# Patient Record
Sex: MALE | Race: WHITE | Employment: FULL TIME | URBAN - METROPOLITAN AREA
[De-identification: names, ages, dates, MRNs, and addresses within clinical notes are randomized per-mention and may not be internally consistent; named-entity substitution may affect disease eponyms.]

---

## 2023-06-21 ENCOUNTER — PREP FOR PROCEDURE (OUTPATIENT)
Dept: ORTHOPEDIC SURGERY | Age: 38
End: 2023-06-21

## 2023-06-21 ENCOUNTER — OFFICE VISIT (OUTPATIENT)
Dept: ORTHOPEDIC SURGERY | Age: 38
End: 2023-06-21

## 2023-06-21 VITALS
WEIGHT: 175 LBS | SYSTOLIC BLOOD PRESSURE: 143 MMHG | HEART RATE: 80 BPM | DIASTOLIC BLOOD PRESSURE: 73 MMHG | BODY MASS INDEX: 23.7 KG/M2 | TEMPERATURE: 97.6 F | OXYGEN SATURATION: 98 % | HEIGHT: 72 IN

## 2023-06-21 DIAGNOSIS — S29.011A RUPTURE OF PECTORALIS MAJOR MUSCLE, INITIAL ENCOUNTER: ICD-10-CM

## 2023-06-21 DIAGNOSIS — Z01.818 PRE-OP EXAM: ICD-10-CM

## 2023-06-21 DIAGNOSIS — M25.511 RIGHT SHOULDER PAIN, UNSPECIFIED CHRONICITY: Primary | ICD-10-CM

## 2023-06-21 LAB
ANION GAP SERPL CALCULATED.3IONS-SCNC: 14 MEQ/L (ref 9–15)
BASOPHILS # BLD: 0 K/UL (ref 0–0.2)
BASOPHILS NFR BLD: 0.9 %
BUN SERPL-MCNC: 17 MG/DL (ref 6–20)
CALCIUM SERPL-MCNC: 9.6 MG/DL (ref 8.5–9.9)
CHLORIDE SERPL-SCNC: 102 MEQ/L (ref 95–107)
CO2 SERPL-SCNC: 25 MEQ/L (ref 20–31)
CREAT SERPL-MCNC: 0.94 MG/DL (ref 0.7–1.2)
EOSINOPHIL # BLD: 0 K/UL (ref 0–0.7)
EOSINOPHIL NFR BLD: 0.9 %
ERYTHROCYTE [DISTWIDTH] IN BLOOD BY AUTOMATED COUNT: 13.3 % (ref 11.5–14.5)
GLUCOSE SERPL-MCNC: 104 MG/DL (ref 70–99)
HCT VFR BLD AUTO: 40.2 % (ref 42–52)
HGB BLD-MCNC: 13.6 G/DL (ref 14–18)
LYMPHOCYTES # BLD: 1.2 K/UL (ref 1–4.8)
LYMPHOCYTES NFR BLD: 22.4 %
MCH RBC QN AUTO: 30.5 PG (ref 27–31.3)
MCHC RBC AUTO-ENTMCNC: 33.8 % (ref 33–37)
MCV RBC AUTO: 90.2 FL (ref 79–92.2)
MONOCYTES # BLD: 0.2 K/UL (ref 0.2–0.8)
MONOCYTES NFR BLD: 4.7 %
NEUTROPHILS # BLD: 3.7 K/UL (ref 1.4–6.5)
NEUTS SEG NFR BLD: 71.1 %
PLATELET # BLD AUTO: 226 K/UL (ref 130–400)
POTASSIUM SERPL-SCNC: 4.3 MEQ/L (ref 3.4–4.9)
RBC # BLD AUTO: 4.46 M/UL (ref 4.7–6.1)
SODIUM SERPL-SCNC: 141 MEQ/L (ref 135–144)
WBC # BLD AUTO: 5.2 K/UL (ref 4.8–10.8)

## 2023-06-21 RX ORDER — CHLORHEXIDINE GLUCONATE 4 G/100ML
SOLUTION TOPICAL
Qty: 118 ML | Refills: 0 | Status: SHIPPED | OUTPATIENT
Start: 2023-06-21

## 2023-06-25 RX ORDER — SODIUM CHLORIDE 0.9 % (FLUSH) 0.9 %
5-40 SYRINGE (ML) INJECTION EVERY 12 HOURS SCHEDULED
OUTPATIENT
Start: 2023-06-25

## 2023-06-25 RX ORDER — SODIUM CHLORIDE 9 MG/ML
INJECTION, SOLUTION INTRAVENOUS PRN
OUTPATIENT
Start: 2023-06-25

## 2023-06-25 RX ORDER — SODIUM CHLORIDE 0.9 % (FLUSH) 0.9 %
5-40 SYRINGE (ML) INJECTION PRN
OUTPATIENT
Start: 2023-06-25

## 2023-06-25 RX ORDER — SODIUM CHLORIDE, SODIUM LACTATE, POTASSIUM CHLORIDE, CALCIUM CHLORIDE 600; 310; 30; 20 MG/100ML; MG/100ML; MG/100ML; MG/100ML
INJECTION, SOLUTION INTRAVENOUS CONTINUOUS
OUTPATIENT
Start: 2023-06-25

## 2023-06-26 ENCOUNTER — HOSPITAL ENCOUNTER (OUTPATIENT)
Age: 38
Setting detail: OUTPATIENT SURGERY
Discharge: HOME | End: 2023-06-26
Attending: ORTHOPAEDIC SURGERY | Admitting: ORTHOPAEDIC SURGERY
Payer: COMMERCIAL

## 2023-06-26 ENCOUNTER — ANESTHESIA EVENT (OUTPATIENT)
Dept: OPERATING ROOM | Age: 38
End: 2023-06-26
Payer: COMMERCIAL

## 2023-06-26 ENCOUNTER — ANESTHESIA (OUTPATIENT)
Dept: OPERATING ROOM | Age: 38
End: 2023-06-26
Payer: COMMERCIAL

## 2023-06-26 VITALS
BODY MASS INDEX: 23.7 KG/M2 | HEART RATE: 71 BPM | RESPIRATION RATE: 16 BRPM | HEIGHT: 72 IN | OXYGEN SATURATION: 95 % | WEIGHT: 175 LBS | SYSTOLIC BLOOD PRESSURE: 125 MMHG | TEMPERATURE: 97.5 F | DIASTOLIC BLOOD PRESSURE: 67 MMHG

## 2023-06-26 DIAGNOSIS — S29.011D RUPTURE OF PECTORALIS MAJOR MUSCLE, SUBSEQUENT ENCOUNTER: Primary | ICD-10-CM

## 2023-06-26 PROCEDURE — 7100000011 HC PHASE II RECOVERY - ADDTL 15 MIN: Performed by: ORTHOPAEDIC SURGERY

## 2023-06-26 PROCEDURE — 2580000003 HC RX 258: Performed by: ORTHOPAEDIC SURGERY

## 2023-06-26 PROCEDURE — 2580000003 HC RX 258: Performed by: PHYSICIAN ASSISTANT

## 2023-06-26 PROCEDURE — 24341 RPR TDN/MUSC UPR A/E EACH: CPT | Performed by: ORTHOPAEDIC SURGERY

## 2023-06-26 PROCEDURE — 3700000001 HC ADD 15 MINUTES (ANESTHESIA): Performed by: ORTHOPAEDIC SURGERY

## 2023-06-26 PROCEDURE — 2500000003 HC RX 250 WO HCPCS: Performed by: ORTHOPAEDIC SURGERY

## 2023-06-26 PROCEDURE — 64415 NJX AA&/STRD BRCH PLXS IMG: CPT | Performed by: STUDENT IN AN ORGANIZED HEALTH CARE EDUCATION/TRAINING PROGRAM

## 2023-06-26 PROCEDURE — 2720000010 HC SURG SUPPLY STERILE: Performed by: ORTHOPAEDIC SURGERY

## 2023-06-26 PROCEDURE — 6360000002 HC RX W HCPCS: Performed by: NURSE ANESTHETIST, CERTIFIED REGISTERED

## 2023-06-26 PROCEDURE — 7100000001 HC PACU RECOVERY - ADDTL 15 MIN: Performed by: ORTHOPAEDIC SURGERY

## 2023-06-26 PROCEDURE — 2709999900 HC NON-CHARGEABLE SUPPLY: Performed by: ORTHOPAEDIC SURGERY

## 2023-06-26 PROCEDURE — 6370000000 HC RX 637 (ALT 250 FOR IP): Performed by: STUDENT IN AN ORGANIZED HEALTH CARE EDUCATION/TRAINING PROGRAM

## 2023-06-26 PROCEDURE — 6360000002 HC RX W HCPCS: Performed by: PHYSICIAN ASSISTANT

## 2023-06-26 PROCEDURE — 6360000002 HC RX W HCPCS: Performed by: STUDENT IN AN ORGANIZED HEALTH CARE EDUCATION/TRAINING PROGRAM

## 2023-06-26 PROCEDURE — C1713 ANCHOR/SCREW BN/BN,TIS/BN: HCPCS | Performed by: ORTHOPAEDIC SURGERY

## 2023-06-26 PROCEDURE — 3600000004 HC SURGERY LEVEL 4 BASE: Performed by: ORTHOPAEDIC SURGERY

## 2023-06-26 PROCEDURE — 3700000000 HC ANESTHESIA ATTENDED CARE: Performed by: ORTHOPAEDIC SURGERY

## 2023-06-26 PROCEDURE — 2500000003 HC RX 250 WO HCPCS: Performed by: NURSE ANESTHETIST, CERTIFIED REGISTERED

## 2023-06-26 PROCEDURE — 3600000014 HC SURGERY LEVEL 4 ADDTL 15MIN: Performed by: ORTHOPAEDIC SURGERY

## 2023-06-26 PROCEDURE — 7100000000 HC PACU RECOVERY - FIRST 15 MIN: Performed by: ORTHOPAEDIC SURGERY

## 2023-06-26 PROCEDURE — A4217 STERILE WATER/SALINE, 500 ML: HCPCS | Performed by: ORTHOPAEDIC SURGERY

## 2023-06-26 PROCEDURE — 7100000010 HC PHASE II RECOVERY - FIRST 15 MIN: Performed by: ORTHOPAEDIC SURGERY

## 2023-06-26 DEVICE — KIT REP INCL 3 L PEC BTTN ATTCH INSRT FIBERTAPE SUTS W/ NDL: Type: IMPLANTABLE DEVICE | Site: SHOULDER | Status: FUNCTIONAL

## 2023-06-26 RX ORDER — MIDAZOLAM HYDROCHLORIDE 1 MG/ML
INJECTION INTRAMUSCULAR; INTRAVENOUS PRN
Status: DISCONTINUED | OUTPATIENT
Start: 2023-06-26 | End: 2023-06-26 | Stop reason: SDUPTHER

## 2023-06-26 RX ORDER — SODIUM CHLORIDE 0.9 % (FLUSH) 0.9 %
5-40 SYRINGE (ML) INJECTION PRN
Status: DISCONTINUED | OUTPATIENT
Start: 2023-06-26 | End: 2023-06-26 | Stop reason: HOSPADM

## 2023-06-26 RX ORDER — MAGNESIUM HYDROXIDE 1200 MG/15ML
LIQUID ORAL CONTINUOUS PRN
Status: DISCONTINUED | OUTPATIENT
Start: 2023-06-26 | End: 2023-06-26 | Stop reason: HOSPADM

## 2023-06-26 RX ORDER — SODIUM CHLORIDE 9 MG/ML
INJECTION, SOLUTION INTRAVENOUS PRN
Status: DISCONTINUED | OUTPATIENT
Start: 2023-06-26 | End: 2023-06-26 | Stop reason: HOSPADM

## 2023-06-26 RX ORDER — OXYCODONE HYDROCHLORIDE AND ACETAMINOPHEN 5; 325 MG/1; MG/1
1 TABLET ORAL EVERY 6 HOURS PRN
Qty: 20 TABLET | Refills: 0 | Status: SHIPPED | OUTPATIENT
Start: 2023-06-26 | End: 2023-07-01

## 2023-06-26 RX ORDER — ONDANSETRON 2 MG/ML
INJECTION INTRAMUSCULAR; INTRAVENOUS PRN
Status: DISCONTINUED | OUTPATIENT
Start: 2023-06-26 | End: 2023-06-26 | Stop reason: SDUPTHER

## 2023-06-26 RX ORDER — SODIUM CHLORIDE 0.9 % (FLUSH) 0.9 %
5-40 SYRINGE (ML) INJECTION EVERY 12 HOURS SCHEDULED
Status: DISCONTINUED | OUTPATIENT
Start: 2023-06-26 | End: 2023-06-26 | Stop reason: HOSPADM

## 2023-06-26 RX ORDER — ROCURONIUM BROMIDE 10 MG/ML
INJECTION, SOLUTION INTRAVENOUS PRN
Status: DISCONTINUED | OUTPATIENT
Start: 2023-06-26 | End: 2023-06-26 | Stop reason: SDUPTHER

## 2023-06-26 RX ORDER — ROPIVACAINE HYDROCHLORIDE 5 MG/ML
INJECTION, SOLUTION EPIDURAL; INFILTRATION; PERINEURAL
Status: COMPLETED | OUTPATIENT
Start: 2023-06-26 | End: 2023-06-26

## 2023-06-26 RX ORDER — MEPERIDINE HYDROCHLORIDE 50 MG/ML
INJECTION INTRAMUSCULAR; INTRAVENOUS; SUBCUTANEOUS PRN
Status: DISCONTINUED | OUTPATIENT
Start: 2023-06-26 | End: 2023-06-26 | Stop reason: SDUPTHER

## 2023-06-26 RX ORDER — PROPOFOL 10 MG/ML
INJECTION, EMULSION INTRAVENOUS PRN
Status: DISCONTINUED | OUTPATIENT
Start: 2023-06-26 | End: 2023-06-26 | Stop reason: SDUPTHER

## 2023-06-26 RX ORDER — DEXAMETHASONE SODIUM PHOSPHATE 10 MG/ML
INJECTION INTRAMUSCULAR; INTRAVENOUS PRN
Status: DISCONTINUED | OUTPATIENT
Start: 2023-06-26 | End: 2023-06-26 | Stop reason: SDUPTHER

## 2023-06-26 RX ORDER — BUPIVACAINE HYDROCHLORIDE AND EPINEPHRINE 5; 5 MG/ML; UG/ML
INJECTION, SOLUTION EPIDURAL; INTRACAUDAL; PERINEURAL PRN
Status: DISCONTINUED | OUTPATIENT
Start: 2023-06-26 | End: 2023-06-26 | Stop reason: HOSPADM

## 2023-06-26 RX ORDER — LIDOCAINE HYDROCHLORIDE 10 MG/ML
1 INJECTION, SOLUTION EPIDURAL; INFILTRATION; INTRACAUDAL; PERINEURAL
Status: DISCONTINUED | OUTPATIENT
Start: 2023-06-26 | End: 2023-06-26 | Stop reason: HOSPADM

## 2023-06-26 RX ORDER — METOCLOPRAMIDE HYDROCHLORIDE 5 MG/ML
10 INJECTION INTRAMUSCULAR; INTRAVENOUS
Status: DISCONTINUED | OUTPATIENT
Start: 2023-06-26 | End: 2023-06-26 | Stop reason: HOSPADM

## 2023-06-26 RX ORDER — OXYCODONE HYDROCHLORIDE 5 MG/1
5 CAPSULE ORAL
Status: COMPLETED | OUTPATIENT
Start: 2023-06-26 | End: 2023-06-26

## 2023-06-26 RX ORDER — FENTANYL CITRATE 50 UG/ML
INJECTION, SOLUTION INTRAMUSCULAR; INTRAVENOUS PRN
Status: DISCONTINUED | OUTPATIENT
Start: 2023-06-26 | End: 2023-06-26 | Stop reason: SDUPTHER

## 2023-06-26 RX ORDER — FENTANYL CITRATE 0.05 MG/ML
50 INJECTION, SOLUTION INTRAMUSCULAR; INTRAVENOUS EVERY 10 MIN PRN
Status: DISCONTINUED | OUTPATIENT
Start: 2023-06-26 | End: 2023-06-26 | Stop reason: HOSPADM

## 2023-06-26 RX ORDER — DIPHENHYDRAMINE HYDROCHLORIDE 50 MG/ML
12.5 INJECTION INTRAMUSCULAR; INTRAVENOUS
Status: DISCONTINUED | OUTPATIENT
Start: 2023-06-26 | End: 2023-06-26 | Stop reason: HOSPADM

## 2023-06-26 RX ORDER — MEPERIDINE HYDROCHLORIDE 25 MG/ML
12.5 INJECTION INTRAMUSCULAR; INTRAVENOUS; SUBCUTANEOUS
Status: DISCONTINUED | OUTPATIENT
Start: 2023-06-26 | End: 2023-06-26 | Stop reason: HOSPADM

## 2023-06-26 RX ORDER — CEFAZOLIN SODIUM IN 0.9 % NACL 2 G/100 ML
2000 PLASTIC BAG, INJECTION (ML) INTRAVENOUS
Status: COMPLETED | OUTPATIENT
Start: 2023-06-26 | End: 2023-06-26

## 2023-06-26 RX ORDER — SODIUM CHLORIDE, SODIUM LACTATE, POTASSIUM CHLORIDE, CALCIUM CHLORIDE 600; 310; 30; 20 MG/100ML; MG/100ML; MG/100ML; MG/100ML
INJECTION, SOLUTION INTRAVENOUS CONTINUOUS
Status: DISCONTINUED | OUTPATIENT
Start: 2023-06-26 | End: 2023-06-26 | Stop reason: HOSPADM

## 2023-06-26 RX ORDER — ONDANSETRON 2 MG/ML
4 INJECTION INTRAMUSCULAR; INTRAVENOUS
Status: DISCONTINUED | OUTPATIENT
Start: 2023-06-26 | End: 2023-06-26 | Stop reason: HOSPADM

## 2023-06-26 RX ORDER — HYDROMORPHONE HYDROCHLORIDE 1 MG/ML
0.5 INJECTION, SOLUTION INTRAMUSCULAR; INTRAVENOUS; SUBCUTANEOUS EVERY 10 MIN PRN
Status: DISCONTINUED | OUTPATIENT
Start: 2023-06-26 | End: 2023-06-26 | Stop reason: HOSPADM

## 2023-06-26 RX ADMIN — SODIUM CHLORIDE, POTASSIUM CHLORIDE, SODIUM LACTATE AND CALCIUM CHLORIDE: 600; 310; 30; 20 INJECTION, SOLUTION INTRAVENOUS at 13:39

## 2023-06-26 RX ADMIN — ROCURONIUM BROMIDE 20 MG: 10 INJECTION, SOLUTION INTRAVENOUS at 14:22

## 2023-06-26 RX ADMIN — Medication 2000 MG: at 12:25

## 2023-06-26 RX ADMIN — PROPOFOL 250 MG: 10 INJECTION, EMULSION INTRAVENOUS at 12:25

## 2023-06-26 RX ADMIN — ROCURONIUM BROMIDE 30 MG: 10 INJECTION, SOLUTION INTRAVENOUS at 13:58

## 2023-06-26 RX ADMIN — SODIUM CHLORIDE, POTASSIUM CHLORIDE, SODIUM LACTATE AND CALCIUM CHLORIDE: 600; 310; 30; 20 INJECTION, SOLUTION INTRAVENOUS at 09:48

## 2023-06-26 RX ADMIN — TRANEXAMIC ACID 1000 MG: 100 INJECTION, SOLUTION INTRAVENOUS at 12:45

## 2023-06-26 RX ADMIN — OXYCODONE HYDROCHLORIDE 5 MG: 5 CAPSULE ORAL at 16:21

## 2023-06-26 RX ADMIN — MIDAZOLAM HYDROCHLORIDE 2 MG: 1 INJECTION, SOLUTION INTRAMUSCULAR; INTRAVENOUS at 11:54

## 2023-06-26 RX ADMIN — ROCURONIUM BROMIDE 20 MG: 10 INJECTION, SOLUTION INTRAVENOUS at 13:18

## 2023-06-26 RX ADMIN — SODIUM CHLORIDE, POTASSIUM CHLORIDE, SODIUM LACTATE AND CALCIUM CHLORIDE: 600; 310; 30; 20 INJECTION, SOLUTION INTRAVENOUS at 12:55

## 2023-06-26 RX ADMIN — DEXAMETHASONE SODIUM PHOSPHATE 10 MG: 10 INJECTION INTRAMUSCULAR; INTRAVENOUS at 12:29

## 2023-06-26 RX ADMIN — ONDANSETRON 4 MG: 2 INJECTION INTRAMUSCULAR; INTRAVENOUS at 12:29

## 2023-06-26 RX ADMIN — SODIUM CHLORIDE, POTASSIUM CHLORIDE, SODIUM LACTATE AND CALCIUM CHLORIDE: 600; 310; 30; 20 INJECTION, SOLUTION INTRAVENOUS at 12:22

## 2023-06-26 RX ADMIN — ROCURONIUM BROMIDE 30 MG: 10 INJECTION, SOLUTION INTRAVENOUS at 12:54

## 2023-06-26 RX ADMIN — MEPERIDINE HYDROCHLORIDE 50 MG: 50 INJECTION, SOLUTION INTRAMUSCULAR; INTRAVENOUS; SUBCUTANEOUS at 14:36

## 2023-06-26 RX ADMIN — FENTANYL CITRATE 100 MCG: 50 INJECTION, SOLUTION INTRAMUSCULAR; INTRAVENOUS at 12:25

## 2023-06-26 RX ADMIN — ROCURONIUM BROMIDE 50 MG: 10 INJECTION, SOLUTION INTRAVENOUS at 12:25

## 2023-06-26 RX ADMIN — TRANEXAMIC ACID 1000 MG: 100 INJECTION, SOLUTION INTRAVENOUS at 14:21

## 2023-06-26 RX ADMIN — ROPIVACAINE HYDROCHLORIDE 30 ML: 5 INJECTION, SOLUTION EPIDURAL; INFILTRATION; PERINEURAL at 11:58

## 2023-06-26 RX ADMIN — SUGAMMADEX 200 MG: 100 INJECTION, SOLUTION INTRAVENOUS at 14:44

## 2023-06-26 ASSESSMENT — PAIN DESCRIPTION - LOCATION
LOCATION: SHOULDER
LOCATION: SHOULDER
LOCATION: ARM
LOCATION: SHOULDER

## 2023-06-26 ASSESSMENT — PAIN DESCRIPTION - ONSET
ONSET: GRADUAL
ONSET: GRADUAL

## 2023-06-26 ASSESSMENT — PAIN DESCRIPTION - PAIN TYPE
TYPE: SURGICAL PAIN
TYPE: SURGICAL PAIN

## 2023-06-26 ASSESSMENT — PAIN SCALES - GENERAL
PAINLEVEL_OUTOF10: 0
PAINLEVEL_OUTOF10: 4
PAINLEVEL_OUTOF10: 7
PAINLEVEL_OUTOF10: 2
PAINLEVEL_OUTOF10: 0
PAINLEVEL_OUTOF10: 4
PAINLEVEL_OUTOF10: 4
PAINLEVEL_OUTOF10: 5
PAINLEVEL_OUTOF10: 3

## 2023-06-26 ASSESSMENT — PAIN - FUNCTIONAL ASSESSMENT: PAIN_FUNCTIONAL_ASSESSMENT: PREVENTS OR INTERFERES WITH MANY ACTIVE NOT PASSIVE ACTIVITIES

## 2023-06-26 ASSESSMENT — PAIN DESCRIPTION - ORIENTATION
ORIENTATION: RIGHT

## 2023-06-26 ASSESSMENT — PAIN DESCRIPTION - FREQUENCY: FREQUENCY: CONTINUOUS

## 2023-06-26 ASSESSMENT — PAIN DESCRIPTION - DESCRIPTORS
DESCRIPTORS: DULL;ACHING
DESCRIPTORS: DULL;ACHING;NUMBNESS

## 2023-07-05 ENCOUNTER — OFFICE VISIT (OUTPATIENT)
Dept: ORTHOPEDIC SURGERY | Age: 38
End: 2023-07-05

## 2023-07-05 DIAGNOSIS — S29.011A RUPTURE OF PECTORALIS MAJOR MUSCLE, INITIAL ENCOUNTER: Primary | ICD-10-CM

## 2023-07-05 NOTE — PROGRESS NOTES
Subjective:      Patient ID: Roman Carrasco is a 43 y.o. male who presents today for:  Chief Complaint   Patient presents with    Post-Op Check     Postop visit for Right shoulder pain, 1 weeks out       HPI:  The patient is here after undergoing right pectoralis major repair on 6/6/2023. This is first follow-up visit. Past Medical History:   Diagnosis Date    Heart murmur      Past Surgical History:   Procedure Laterality Date    CHEST SURGERY Right 6/26/2023    Right open pectoralis major repair performed by Bettye White DO at [address] [place] [address]         Tobacco Use      Smoking status: Never      Smokeless tobacco: Never     reports no history of drug use. No Known Allergies    Current Outpatient Medications:     chlorhexidine (HIBICLENS) 4 % external liquid, Scrub surgical area each day, starting 5 days before scheduled surgery date, Disp: 118 mL, Rfl: 0    Objective: There were no vitals taken for this visit. Physical Exam:  Incision is well approximate without signs of infection. He does have some numbness around incision site. He is able to move his elbow wrist and fingers. No motor deficits. Radiographs and Laboratory Studies:     Diagnostic Imaging Studies:    XR SHOULDER RIGHT (MIN 2 VIEWS)  Right shoulder x-rays taken today. 3 views. No acute osseous   abnormalities. Assessment:      Diagnosis Orders   1. Rupture of pectoralis major muscle, initial encounter                Plan:      I have outlined a detailed utilization protocol for him on his discharge summary the day of surgery. We given a is there a prescription began at 3 weeks. I will see him back in 1 month.         Bettye White DO  Orthopedic and Spine Surgeon  [facility]  [phone]

## 2023-07-31 ENCOUNTER — TELEPHONE (OUTPATIENT)
Dept: ORTHOPEDIC SURGERY | Age: 38
End: 2023-07-31

## 2023-08-01 ENCOUNTER — HOSPITAL ENCOUNTER (OUTPATIENT)
Dept: PHYSICAL THERAPY | Age: 38
Setting detail: THERAPIES SERIES
End: 2023-08-01
Attending: ORTHOPAEDIC SURGERY
Payer: COMMERCIAL

## 2023-08-07 ENCOUNTER — OFFICE VISIT (OUTPATIENT)
Dept: ORTHOPEDIC SURGERY | Age: 38
End: 2023-08-07

## 2023-08-07 VITALS
HEART RATE: 74 BPM | HEIGHT: 72 IN | BODY MASS INDEX: 23.57 KG/M2 | WEIGHT: 174 LBS | OXYGEN SATURATION: 96 % | TEMPERATURE: 97.4 F

## 2023-08-07 DIAGNOSIS — S29.011A RUPTURE OF PECTORALIS MAJOR MUSCLE, INITIAL ENCOUNTER: Primary | ICD-10-CM

## 2023-08-07 PROCEDURE — 99024 POSTOP FOLLOW-UP VISIT: CPT | Performed by: ORTHOPAEDIC SURGERY

## 2023-08-17 ENCOUNTER — HOSPITAL ENCOUNTER (OUTPATIENT)
Dept: PHYSICAL THERAPY | Age: 38
Setting detail: THERAPIES SERIES
Discharge: HOME | End: 2023-08-17
Payer: COMMERCIAL

## 2023-08-17 PROCEDURE — 97161 PT EVAL LOW COMPLEX 20 MIN: CPT

## 2023-08-17 NOTE — PROGRESS NOTES
[facility]  PHYSICAL THERAPY EVALUATION      Physical Therapy: Initial Evaluation    Patient: Melinda Lamar (18 y.o.     male)   Examination Date: 2023   :  1985 ;    Confirmed: Yes MRN: [id number]  CSN: [id number]   Insurance: Payor: MED[employer] / Plan: [address] / Product Type: *No Product type* /   Insurance ID: [id number] - (Commercial)  PT Insurance Information: Medical [place] Secondary Insurance (if applicable):     Referring Physician: Brian Kathleen DO       Visits to Date/Visits Approved:  (per calendar year)    No Show/Cancelled Appts: 0 / 0     Medical Diagnosis: Strain of muscle and tendon of front wall of thorax, initial encounter [S29.011A]        Treatment Diagnosis: decreased R shoulder P/AROM, impaired reaching and lifting with pt's dominant RUE     PERTINENT MEDICAL HISTORY   Patient Assessed for Rehabilitation Services: Yes       Medical History: Chart Reviewed: Yes   Past Medical History:   Diagnosis Date    Heart murmur      Surgical History:   Past Surgical History:   Procedure Laterality Date    CHEST SURGERY Right 2023    Right open pectoralis major repair performed by Brian Kathleen DO at [address] [place] [address]         Medications:   Current Outpatient Medications:     chlorhexidine (HIBICLENS) 4 % external liquid, Scrub surgical area each day, starting 5 days before scheduled surgery date, Disp: 118 mL, Rfl: 0  Allergies: Patient has no known allergies. Imaging (if applicable): No results found. SUBJECTIVE EXAMINATION     History obtained from[de-identified] Patient, Chart Review,      Family/Caregiver Present: No    Subjective History: Onset Date: 23  Subjective: Pt s/p R pec tendon repair on 23 by Dr. Brynn Ordoñez. Pt reports he was immobilized in sling for 3 weeks and then began post-op exercises independently. Pt is a chiropractor.  Pt notes going to the gym a couple times and doing light upper body workouts,

## 2023-08-17 NOTE — PROGRESS NOTES
[address] [facility]  PHYSICAL THERAPY PLAN OF CARE   [address] [facility] Alex [place], [address] [facility]         Phone: [phone]  Fax: [phone]    [] Certification  [] Recertification [x]  Plan of Care  [] Progress Note [] Discharge      Referring Provider: Roman Hodge DO     From:  Tee Felix, PT, DPT  Patient: Arian Zavala (43 y.o. male) : 1985 Date: 2023  Medical Diagnosis: Strain of muscle and tendon of front wall of thorax, initial encounter [S29.011A]       Treatment Diagnosis: decreased R shoulder P/AROM, impaired reaching and lifting with pt's dominant RUE    Progress Report Period from:  2023  to 2023    Visits to Date: 1 No Show: 0 Cancelled Appts: 0    OBJECTIVE:   Long Term Goals - Time Frame for Long Term Goals : 12-16 weeks  Goals Current/ Discharge status Status   Long Term Goal 1: Full pain free R shoulder P/AROM with ability to progress into unrestricted strengthening program.     AROM RUE (degrees)  R Shoulder Flexion (0-180): 140  R Shoulder Extension (0-45): 20  R Shoulder ABduction (0-180): 115  R Shoulder Int Rotation  (0-70): NT  R Shoulder Ext Rotation (0-90): 52 deg in neutral  R Should Horiz ABduction (0-40): to frontal plane  R Shoulder Horiz ADduction (0-120): WNL  R Elbow Flexion (0-145): 145  R Elbow Extension (145-0): 0       PROM = AROM with soft tissues tightness/stretch noted     New   Long Term Goal 2: 5/5 R shoulder girdle and chest strength to resume gym based weight training (per surgical protocol guidelines)  Strength LUE  Strength LUE: WNL  Strength RUE  Comment: shoulder at least 3/5 in all planes; elbow at least 3+/5 - full MMT not assesse d/t acuity of shoulder/pec surgery               New   Long Term Goal 3: UEFI score 76/80 or better to demo improved functional use of pt's dominant RUE  Exam: UEFI: 64/80     New   Long Term Goal 4: Independent with HEP to progress functional strength upon PT discharge.   Ongoing per protocol   New     Body

## 2023-09-15 ENCOUNTER — HOSPITAL ENCOUNTER (OUTPATIENT)
Dept: PHYSICAL THERAPY | Age: 38
Setting detail: THERAPIES SERIES
Discharge: HOME | End: 2023-09-15
Payer: COMMERCIAL

## 2023-09-15 PROCEDURE — 97110 THERAPEUTIC EXERCISES: CPT

## 2023-09-15 NOTE — PROGRESS NOTES
[address]  Outpatient Physical Therapy    Treatment Note        Date: 9/15/2023  Patient: Lisa Adams  : 1985   Confirmed: Yes  MRN: [id number]  Referring Provider: Adrián Durham DO    Medical Diagnosis: Strain of muscle and tendon of front wall of thorax, initial encounter [S29.011A]       Treatment Diagnosis: decreased R shoulder P/AROM, impaired reaching and lifting with pt's dominant RUE    Visit Information:  Insurance: Payor: MED[employer] M[employer]UAL / Plan: MED[employer] M[employer]UAL ROSEANNA - EXCHANGE / Product Type: *No Product type* /   PT Visit Information  Onset Date: 23  PT Insurance Information: Medical [place]  Total # of Visits Approved: 20 (per calendar year)  Total # of Visits to Date: 2  No Show: 0  Canceled Appointment: 0  Progress Note Counter:     Subjective Information:  Subjective: Pt reports he has been feeling really good. Pt has had a couple deep tissue massage a few times and returned to exercising at the gym about 2x/wk, using very light weight on machines. Pt reports he is thinking about doing some light throwing with a tennis ball today.   HEP Compliance:  [x] Good [] Fair [] Poor [] Reports not doing due to:    Pain Screening  Patient Currently in Pain: Denies    Treatment:  Exercises:  Exercises  Exercise 1: **advance per surgical protocol / MD guidelines **  Exercise 2: UBE: L1 x 6 min, alternating every 60\"  Exercise 3: overhead wall dribble (single arm, double arm)  Exercise 4: chest press machine: double arm at 10# x 20, 20# x 15  Exercise 5: rows with shoulder extension stopping at neutral: 20# x 20  Exercise 6: wall push plyo's (double arm x 10-15)  Exercise 7: discussed ball toss with rebounder  Reviewed erasmo ABD stretch  Discussed surgical protocol for clarity on week 12-16 activity progressions       *Indicates exercise, modality, or manual techniques to be initiated when appropriate    Objective Measures:   Anterior R shoulder/pec tightness with horz abd

## 2023-09-19 ENCOUNTER — OFFICE VISIT (OUTPATIENT)
Dept: ORTHOPEDIC SURGERY | Age: 38
End: 2023-09-19
Payer: COMMERCIAL

## 2023-09-19 VITALS
HEART RATE: 74 BPM | TEMPERATURE: 97.5 F | HEIGHT: 72 IN | OXYGEN SATURATION: 97 % | WEIGHT: 174 LBS | BODY MASS INDEX: 23.57 KG/M2

## 2023-09-19 DIAGNOSIS — S29.011A RUPTURE OF PECTORALIS MAJOR MUSCLE, INITIAL ENCOUNTER: Primary | ICD-10-CM

## 2023-09-19 PROCEDURE — 99213 OFFICE O/P EST LOW 20 MIN: CPT | Performed by: ORTHOPAEDIC SURGERY

## 2025-05-20 ENCOUNTER — OFFICE VISIT (OUTPATIENT)
Dept: ORTHOPEDIC SURGERY | Facility: CLINIC | Age: 40
End: 2025-05-20
Payer: COMMERCIAL

## 2025-05-20 ENCOUNTER — HOSPITAL ENCOUNTER (OUTPATIENT)
Dept: RADIOLOGY | Facility: HOSPITAL | Age: 40
Discharge: HOME | End: 2025-05-20
Payer: COMMERCIAL

## 2025-05-20 ENCOUNTER — HOSPITAL ENCOUNTER (OUTPATIENT)
Dept: RADIOLOGY | Facility: CLINIC | Age: 40
Discharge: HOME | End: 2025-05-20
Payer: COMMERCIAL

## 2025-05-20 DIAGNOSIS — M79.645 FINGER PAIN, LEFT: ICD-10-CM

## 2025-05-20 DIAGNOSIS — S62.629A AVULSION FRACTURE OF MIDDLE PHALANX OF FINGER, CLOSED, INITIAL ENCOUNTER: ICD-10-CM

## 2025-05-20 DIAGNOSIS — M20.022 CENTRAL SLIP EXTENSOR TENDON INJURY (BOUTONNIERE), LEFT: ICD-10-CM

## 2025-05-20 DIAGNOSIS — Z01.818 PRE-OP EXAM: ICD-10-CM

## 2025-05-20 DIAGNOSIS — S62.629A AVULSION FRACTURE OF MIDDLE PHALANX OF FINGER, CLOSED, INITIAL ENCOUNTER: Primary | ICD-10-CM

## 2025-05-20 PROCEDURE — 73140 X-RAY EXAM OF FINGER(S): CPT | Mod: LT

## 2025-05-20 PROCEDURE — 73200 CT UPPER EXTREMITY W/O DYE: CPT | Mod: LT

## 2025-05-20 PROCEDURE — 99203 OFFICE O/P NEW LOW 30 MIN: CPT | Performed by: ORTHOPAEDIC SURGERY

## 2025-05-20 PROCEDURE — 99204 OFFICE O/P NEW MOD 45 MIN: CPT | Performed by: ORTHOPAEDIC SURGERY

## 2025-05-20 PROCEDURE — 73140 X-RAY EXAM OF FINGER(S): CPT | Mod: LEFT SIDE | Performed by: ORTHOPAEDIC SURGERY

## 2025-05-20 NOTE — PROGRESS NOTES
History present illness: Patient presents today 10 days status post injury to the left long finger.  He was bouncing on a trampoline.  He came down awkwardly.  Right-hand-dominant.  Otherwise healthy.  Works as a chiropractor.      Past medical history: The patient's past medical history, family history, social history, and review of systems were documented on the patient medical intake.  The updated data was reviewed in the electronic medical record.  History is negative except otherwise stated in history of present illness.        Physical examination:  General: Alert and oriented to person, place, and time.  No acute distress and breathing comfortably: Pleasant and cooperative with examination.  HEENT: Head is normocephalic and atraumatic.  Neck: Supple, no visible swelling.  Cardiovascular: No palpable tachycardia  Lungs: No audible wheezing or labored breathing  Abdomen: Nondistended.  Extremities: Evaluation of the left upper extremity finds the patient had palpable radial artery at the wrist with brisk capillary refill to all digits.  Patient has intact sensation to axillary radial median and ulnar nerves.  There are no open wounds.  There are no signs of infection.  There is no evidence of lymphedema or lymphatic streaking.  The patient has supple compartments to left arm forearm and hand.  Boutonniere deformity to the left long finger with swelling and stiffness to PIP joint along with tenderness to palpation.      Radiology: Dorsal avulsion fracture at level of PIP joint concerning for avulsion from central slip or articular injury from P2 base or distal aspect of P1.      Assessment: Complex left long finger PIP injury with central slip disruption and bony injury      Plan: Treatment options were discussed.  We talked about operative and nonoperative strategies.  Given the boutonniere posture there is definitely a central slip disruption.  It is unclear based on x-ray alone where the bony fragment is  coming from.  Recommend for stat thin cut CT to further define that.  Plan for surgery for open treatment of the central slip disruption like with cross pinning of the PIP joint and repair through suture anchor construct.  Based on the site of avulsion of the bony injury will determine method of treatment.  CT will help.  Patient was placed into a U-shaped AlumaFoam splint.        Procedure:

## 2025-05-21 ENCOUNTER — ANCILLARY PROCEDURE (OUTPATIENT)
Dept: CARDIOLOGY | Facility: CLINIC | Age: 40
End: 2025-05-21
Payer: COMMERCIAL

## 2025-05-21 DIAGNOSIS — Z01.818 PRE-OP EXAM: ICD-10-CM

## 2025-05-21 PROCEDURE — 93000 ELECTROCARDIOGRAM COMPLETE: CPT | Performed by: INTERNAL MEDICINE

## 2025-05-21 NOTE — PROGRESS NOTES
Pt in office for EKG visit ordered by Nury Lane PA-C for pre op. Dr. Lee Perdue MD, FACC in office physician. EKG scanned to pt's chart. Cata HIGGINS

## 2025-05-22 DIAGNOSIS — M20.022 CENTRAL SLIP EXTENSOR TENDON INJURY (BOUTONNIERE), LEFT: ICD-10-CM

## 2025-05-22 DIAGNOSIS — S62.629A AVULSION FRACTURE OF MIDDLE PHALANX OF FINGER, CLOSED, INITIAL ENCOUNTER: Primary | ICD-10-CM

## 2025-05-22 DIAGNOSIS — G89.18 POST-OP PAIN: ICD-10-CM

## 2025-05-22 LAB
ALBUMIN SERPL-MCNC: 5 G/DL (ref 3.6–5.1)
ALP SERPL-CCNC: 40 U/L (ref 36–130)
ALT SERPL-CCNC: 19 U/L (ref 9–46)
ANION GAP SERPL CALCULATED.4IONS-SCNC: 11 MMOL/L (CALC) (ref 7–17)
AST SERPL-CCNC: 20 U/L (ref 10–40)
BASOPHILS # BLD AUTO: 50 CELLS/UL (ref 0–200)
BASOPHILS NFR BLD AUTO: 0.8 %
BILIRUB SERPL-MCNC: 0.6 MG/DL (ref 0.2–1.2)
BUN SERPL-MCNC: 17 MG/DL (ref 7–25)
CALCIUM SERPL-MCNC: 9.7 MG/DL (ref 8.6–10.3)
CHLORIDE SERPL-SCNC: 101 MMOL/L (ref 98–110)
CO2 SERPL-SCNC: 25 MMOL/L (ref 20–32)
CREAT SERPL-MCNC: 0.87 MG/DL (ref 0.6–1.29)
EGFRCR SERPLBLD CKD-EPI 2021: 111 ML/MIN/1.73M2
EOSINOPHIL # BLD AUTO: 31 CELLS/UL (ref 15–500)
EOSINOPHIL NFR BLD AUTO: 0.5 %
ERYTHROCYTE [DISTWIDTH] IN BLOOD BY AUTOMATED COUNT: 12.3 % (ref 11–15)
GLUCOSE SERPL-MCNC: 95 MG/DL (ref 65–139)
HCT VFR BLD AUTO: 46.1 % (ref 38.5–50)
HGB BLD-MCNC: 14.8 G/DL (ref 13.2–17.1)
LYMPHOCYTES # BLD AUTO: 1190 CELLS/UL (ref 850–3900)
LYMPHOCYTES NFR BLD AUTO: 19.2 %
MCH RBC QN AUTO: 29.2 PG (ref 27–33)
MCHC RBC AUTO-ENTMCNC: 32.1 G/DL (ref 32–36)
MCV RBC AUTO: 91.1 FL (ref 80–100)
MONOCYTES # BLD AUTO: 242 CELLS/UL (ref 200–950)
MONOCYTES NFR BLD AUTO: 3.9 %
NEUTROPHILS # BLD AUTO: 4687 CELLS/UL (ref 1500–7800)
NEUTROPHILS NFR BLD AUTO: 75.6 %
PLATELET # BLD AUTO: 238 THOUSAND/UL (ref 140–400)
PMV BLD REES-ECKER: 10.5 FL (ref 7.5–12.5)
POTASSIUM SERPL-SCNC: 4.4 MMOL/L (ref 3.5–5.3)
PROT SERPL-MCNC: 7.7 G/DL (ref 6.1–8.1)
RBC # BLD AUTO: 5.06 MILLION/UL (ref 4.2–5.8)
SODIUM SERPL-SCNC: 137 MMOL/L (ref 135–146)
WBC # BLD AUTO: 6.2 THOUSAND/UL (ref 3.8–10.8)

## 2025-05-22 RX ORDER — OXYCODONE AND ACETAMINOPHEN 5; 325 MG/1; MG/1
1 TABLET ORAL EVERY 8 HOURS PRN
Qty: 15 TABLET | Refills: 0 | Status: SHIPPED | OUTPATIENT
Start: 2025-05-22 | End: 2025-05-27

## 2025-05-23 PROCEDURE — 14040 TIS TRNFR F/C/C/M/N/A/G/H/F: CPT | Performed by: ORTHOPAEDIC SURGERY

## 2025-05-23 PROCEDURE — 26215 REMOVE/GRAFT FINGER LESION: CPT | Performed by: ORTHOPAEDIC SURGERY

## 2025-05-23 PROCEDURE — 11012 DEB SKIN BONE AT FX SITE: CPT | Performed by: ORTHOPAEDIC SURGERY

## 2025-05-23 PROCEDURE — 26746 TREAT FINGER FRACTURE EACH: CPT | Performed by: ORTHOPAEDIC SURGERY

## 2025-05-23 PROCEDURE — 26418 REPAIR FINGER TENDON: CPT | Performed by: ORTHOPAEDIC SURGERY

## 2025-06-05 ENCOUNTER — OFFICE VISIT (OUTPATIENT)
Dept: ORTHOPEDIC SURGERY | Facility: CLINIC | Age: 40
End: 2025-06-05
Payer: COMMERCIAL

## 2025-06-05 ENCOUNTER — EVALUATION (OUTPATIENT)
Dept: OCCUPATIONAL THERAPY | Facility: CLINIC | Age: 40
End: 2025-06-05
Payer: COMMERCIAL

## 2025-06-05 ENCOUNTER — APPOINTMENT (OUTPATIENT)
Dept: OCCUPATIONAL THERAPY | Facility: CLINIC | Age: 40
End: 2025-06-05
Payer: COMMERCIAL

## 2025-06-05 ENCOUNTER — APPOINTMENT (OUTPATIENT)
Dept: ORTHOPEDIC SURGERY | Facility: CLINIC | Age: 40
End: 2025-06-05
Payer: COMMERCIAL

## 2025-06-05 ENCOUNTER — HOSPITAL ENCOUNTER (OUTPATIENT)
Dept: RADIOLOGY | Facility: CLINIC | Age: 40
Discharge: HOME | End: 2025-06-05
Payer: COMMERCIAL

## 2025-06-05 DIAGNOSIS — S62.629A AVULSION FRACTURE OF MIDDLE PHALANX OF FINGER, CLOSED, INITIAL ENCOUNTER: Primary | ICD-10-CM

## 2025-06-05 DIAGNOSIS — S62.629A AVULSION FRACTURE OF MIDDLE PHALANX OF FINGER, CLOSED, INITIAL ENCOUNTER: ICD-10-CM

## 2025-06-05 DIAGNOSIS — M20.022 CENTRAL SLIP EXTENSOR TENDON INJURY (BOUTONNIERE), LEFT: ICD-10-CM

## 2025-06-05 DIAGNOSIS — S62.623D CLOSED DISPLACED FRACTURE OF MIDDLE PHALANX OF LEFT MIDDLE FINGER WITH ROUTINE HEALING, SUBSEQUENT ENCOUNTER: ICD-10-CM

## 2025-06-05 DIAGNOSIS — M25.642 JOINT STIFFNESS OF HAND, LEFT: ICD-10-CM

## 2025-06-05 DIAGNOSIS — S62.629D: Primary | ICD-10-CM

## 2025-06-05 PROCEDURE — L3933 FO W/O JOINTS CF: HCPCS

## 2025-06-05 PROCEDURE — 73140 X-RAY EXAM OF FINGER(S): CPT | Mod: LEFT SIDE | Performed by: ORTHOPAEDIC SURGERY

## 2025-06-05 PROCEDURE — 99024 POSTOP FOLLOW-UP VISIT: CPT | Performed by: ORTHOPAEDIC SURGERY

## 2025-06-05 PROCEDURE — 97110 THERAPEUTIC EXERCISES: CPT | Mod: GO

## 2025-06-05 PROCEDURE — 99213 OFFICE O/P EST LOW 20 MIN: CPT | Performed by: ORTHOPAEDIC SURGERY

## 2025-06-05 PROCEDURE — 97165 OT EVAL LOW COMPLEX 30 MIN: CPT | Mod: GO

## 2025-06-05 PROCEDURE — 73140 X-RAY EXAM OF FINGER(S): CPT | Mod: LT

## 2025-06-05 PROCEDURE — 1036F TOBACCO NON-USER: CPT | Performed by: ORTHOPAEDIC SURGERY

## 2025-06-05 ASSESSMENT — PATIENT HEALTH QUESTIONNAIRE - PHQ9
1. LITTLE INTEREST OR PLEASURE IN DOING THINGS: NOT AT ALL
SUM OF ALL RESPONSES TO PHQ9 QUESTIONS 1 AND 2: 0
2. FEELING DOWN, DEPRESSED OR HOPELESS: NOT AT ALL

## 2025-06-05 ASSESSMENT — PAIN SCALES - GENERAL: PAINLEVEL_OUTOF10: 1

## 2025-06-05 ASSESSMENT — PAIN - FUNCTIONAL ASSESSMENT: PAIN_FUNCTIONAL_ASSESSMENT: 0-10

## 2025-06-05 ASSESSMENT — PAIN DESCRIPTION - DESCRIPTORS: DESCRIPTORS: PINS AND NEEDLES

## 2025-06-05 NOTE — PROGRESS NOTES
Occupational Therapy   Upper Extremity Evaluation Note    Patient Name: Lee Casanova  MRN: [id number]   Date of Injury: 5/9/25  Mechanism of Injury: jammed L MF   Date of Surgery: 5/23/25  Surgical Procedure: x2 pins in PIP  Precautions:  No ROM in PIP L MF          Current Problem  1. Displaced fracture of middle phalanx of finger with routine healing        2. Joint stiffness of hand, left        3. Central slip extensor tendon injury (boutonniere), left        4. Closed displaced fracture of middle phalanx of left middle finger with routine healing, subsequent encounter         Insurance  *LT MIDDLE FINGER  MMO HMO 20V PT OT ARLENE YR COPAY 0 DED [phone]) COVERAGE 100 OOP [phone])   NO AUTH REQ REF# [id number]  AVAILITY [id number]/ALL     Medicare Certification Period:     GENERAL  General  General Comment: Visit #1/8    IMAGING:           Subjective  Pt report: tired of not being able to use finger, no significant pain reported.   Patient would like to functionally improve/chief concern: ROM, strength       Pain:  Location: Left middle finger   0/10 at rest, 2/10 at highest  Description: pins and needles      ASSESSMENT:    Patient is a 40 y.o. RHDM who is s/p L MF avulsion fx repair of extensor tendon with bone graft from distal radius    Homeliving/Social Support: house with wife and two dtrs and 2 dogs     Work: Chiropractor     Meaningful Activities: golf, yard work, cooking     Previous Level of Function Per Patient/Caregiver Report: Independent with ADL, IADL, work and leisure activities    Chief complaint: tightness/decreased ROM     Patient stated goals for therapy: increase ROM and strength     Objective  Hand Dominance: RIGHT     Affected Extremity:   LEFT     Outcome Measures:   At Eval: Quick Dash 77.27    ADLS/IADLS: MOD IND    Skin/ Wound/Scar: Scar flat, healing well. Tender as expected for stage of recovery.     Sensation: WFL    Dexterity: Monitor    Special Tests:  NA        Edema (cm):   Date:  Right Left   WRIST DWC - -   ELBOW  - -        Date:  Right Left   THUMB P1 - -    IP Circumference - -    P2 - -      Date:  Right Left   FINGER P1 - -    PIP Circumference - -    P2 - -    DIP Circumference - -       ROM and STRENGTH    Hand ROM  THUMB AROM  Right Left    Kapandji x/10 X/10          Palmar Abduction -     Radial Abduction -         Finger   Index Middle Ring Small    MCP - - - -    PIP - - - -    DIP - - - -   ADPC (cm)            Hand Strength  DEFERRED   Cristhian Dynamometer    Gross Grasp (lbs)  Right Left   2nd setting - -       Pinch Strength Right Left   Lateral - -   Tripod - -   Tip  - -       TREATMENT:  -Edema Management including elevation, cryotherapy, Compression   -Clamshell splint fabricated and secured with velcro straps. Pt instructed to wear AAT. Fabricated around pin, patient reporting no discomfort with splint. Demonstrated good ability to doff/don     Orthosis:  L-Code:   FO, no joints    HEP and Patient Education:  -See treatment section above.   -Orthosis full time off for hygiene and exercises. Educated patient to wear, care, purpose, and precautions.  -Educated patient to diagnosis and rehab expectations including frequency and duration of services.         PLAN OF CARE:     Follow Up with Referring Physician: Dr. Mayito Delgadillo  Monitor Home Program  Patient instructed to call or email with questions or concerns.     Frequency: 1x/week  Duration: 8 weeks    GOALS:   Patient to demonstrate, with involved extremity (ies):    -good skill with progressive edema reduction technique facilitating gains with motion and function.   -good carry through with progressive soft tissue management techniques facilitating gains with motion and pain reduction.   -wrist AROM JIMENEZ 120 degrees, to tolerate four point position during cleaning and shifting body position(s).  -finger AROM JIMENEZ 220 degrees, to maintain grasp on ADL objects during use.  -thumb AROM  opposition Albina 9/10, JIMENEZ MCP and  degrees to use small and large digital screen devices without pain.  -Self report of independence with clothing fasteners without pain.   -independence opening packages, [place] Pinch 16#  -independence opening jars and turning door knobs,  Strength 70#  -good skill with progressive orthosis regimen, applying orthosis correctly and using according to medically necessary wear schedule as prescribed by therapist  -Patient to report pain 0/10 at rest for sleeping  -Patient to score disability rate less than 10% on Quick DASH  Patient verbalized good understanding of Therapy Plan of Care and is in a agreement with Occupational Therapy Goals.       CHART REVIEW: YES    OT Evaluation Time Entry  OT Evaluation (Low) Time Entry: 20  OT Therapeutic Procedures Time Entry  Therapeutic Exercise Time Entry: 20

## 2025-06-05 NOTE — PROGRESS NOTES
Patient presents today status post left long finger open treatment of P2 dorsal base fracture and extensor mechanism repair through suture anchor construct.  Pin sites look good.  X-rays are stable.  Recommendations were made for strict nonweightbearing.  Sutures were removed.  He is going upstairs shortly for a custom clamshell splint.  Work on motion to MCP and DIP joint.  Retained pins for an additional 3 weeks.  Return to office in 3 weeks for x-rays of left long finger.  Anticipate pulling the pins at that time with an additional week of persistent 24/7 splinting in extension and then start working on PIP motion recovery.  Patient is agreeable with this strategy.  Given instruction for continuance of monitoring for redness or drainage that would be consistent with pin site infection.

## 2025-06-09 ENCOUNTER — APPOINTMENT (OUTPATIENT)
Dept: OCCUPATIONAL THERAPY | Facility: CLINIC | Age: 40
End: 2025-06-09
Payer: COMMERCIAL

## 2025-06-11 ENCOUNTER — OFFICE VISIT (OUTPATIENT)
Dept: ORTHOPEDIC SURGERY | Facility: CLINIC | Age: 40
End: 2025-06-11
Payer: COMMERCIAL

## 2025-06-11 DIAGNOSIS — S62.629A AVULSION FRACTURE OF MIDDLE PHALANX OF FINGER, CLOSED, INITIAL ENCOUNTER: Primary | ICD-10-CM

## 2025-06-11 PROCEDURE — 99024 POSTOP FOLLOW-UP VISIT: CPT | Performed by: INTERNAL MEDICINE

## 2025-06-11 PROCEDURE — 99213 OFFICE O/P EST LOW 20 MIN: CPT | Performed by: INTERNAL MEDICINE

## 2025-06-11 PROCEDURE — 1036F TOBACCO NON-USER: CPT | Performed by: INTERNAL MEDICINE

## 2025-06-11 NOTE — PROGRESS NOTES
Acute Injury New Patient Visit    CC: No chief complaint on file.      HPI: Lee is a 40 y.o. male presents today for skin check and suture removal. He states that he is doing well.         Review of Systems   GENERAL: Negative for malaise, significant weight loss, fever  MUSCULOSKELETAL: See HPI  NEURO:  Negative for numbness / tingling     Past Medical History  Medical History[1]    Medication review  Medication Documentation Review Audit       Reviewed by Magda Arguello MA (Medical Assistant) on 06/05/25 at 0804      Medication Order Taking? Sig Documenting Provider Last Dose Status            No Medications to Display                                   Allergies  RX Allergies[2]    Social History  Social History     Socioeconomic History    Marital status:      Spouse name: Not on file    Number of children: Not on file    Years of education: Not on file    Highest education level: Not on file   Occupational History    Not on file   Tobacco Use    Smoking status: Never    Smokeless tobacco: Never   Substance and Sexual Activity    Alcohol use: Yes     Alcohol/week: 2.0 standard drinks of alcohol     Types: 2 Cans of beer per week    Drug use: Never    Sexual activity: Yes     Partners: Female     Birth control/protection: Surgical   Other Topics Concern    Not on file   Social History Narrative    Not on file     Social Drivers of Health     Financial Resource Strain: Low Risk  (8/13/2024)    Received from [employer]    Overall Financial Resource Strain (CARDIA)     Difficulty of Paying Living Expenses: Not hard at all   Food Insecurity: No Food Insecurity (8/13/2024)    Received from [employer]    Hunger Vital Sign     Worried About Running Out of Food in the Last Year: Never true     Ran Out of Food in the Last Year: Never true   Transportation Needs: No Transportation Needs (8/13/2024)    Received from [employer]    PRAPARE - Transportation     Lack of Transportation (Medical): No     Lack of  Transportation (Non-Medical): No   Physical Activity: Insufficiently Active (8/13/2024)    Received from [employer]    Exercise Vital Sign     Days of Exercise per Week: 4 days     Minutes of Exercise per Session: 30 min   Stress: No Stress Concern Present (8/13/2024)    Received from [employer]    [place] [employer] - Occupational Stress Questionnaire     Feeling of Stress : Not at all   Social Connections: Moderately Integrated (8/13/2024)    Received from [employer]    Social Connection and Isolation Panel [NHANES]     Frequency of Communication with Friends and Family: More than three times a week     Frequency of Social Gatherings with Friends and Family: Three times a week     Attends Quaker Services: 1 to 4 times per year     Active Member of Clubs or Organizations: No     Attends Club or Organization Meetings: Never     Marital Status:    Intimate Partner Violence: Not At Risk (8/13/2024)    Received from [employer]    Humiliation, Afraid, Rape, and Kick questionnaire     Fear of Current or Ex-Partner: No     Emotionally Abused: No     Physically Abused: No     Sexually Abused: No   Housing Stability: Low Risk  (8/13/2024)    Received from [employer]    Housing Stability Vital Sign     Unable to Pay for Housing in the Last Year: No     Number of Times Moved in the Last Year: 0     Homeless in the Last Year: No       Surgical History  Surgical History[3]    Physical Exam:  GENERAL:  Patient is awake, alert, and oriented to person place and time.  Patient appears well nourished and well kept.  Affect Calm, Not Acutely Distressed.  HEENT:  Normocephalic, Atraumatic, EOMI  CARDIOVASCULAR:  Hemodynamically stable.  RESPIRATORY:  Normal respirations with unlabored breathing.  Extremity: Left hand shows single suture remaining.  No clinical signs of infection.  Stable appearing orthopedic pain.      Diagnostics: None today  XR fingers left 2+ views  Interpreted By:  Elie  Mayito,   STUDY:  XR FINGERS LEFT 2+ VIEWS; 6/5/2025 8:19 am      INDICATION:  Signs/Symptoms:pain.      ACCESSION NUMBER(S):  [id number]      ORDERING CLINICIAN:  MAYITO BILLINGS      FINDINGS:  X-rays of the left long finger demonstrate anatomic alignment of P2  dorsal base fracture. Metallic suture anchor noted to the middle  phalanx base dorsally. Indwelling K-wires show no evidence for  hardware failure or migration.          Signed by: Mayito Billings 6/5/2025 12:24 PM  Dictation workstation:   [id number]      Procedure: None    Assessment: Suture removal     Plan: Lee presents today for suture removal. The suture was removed with no complication. He is clinically doing well. No clinical signs of infection. He will follow-up with Dr Billings as scheduled.     No orders of the defined types were placed in this encounter.     At the conclusion of the visit there were no further questions by the patient/family regarding their plan of care.  Patient was instructed to call or return with any issues, questions, or concerns regarding their injury and/or treatment plan described above.     06/11/25 at 1:33 PM - Alie Rosa MD  Scribe Attestation  By signing my name below, Andriy BURDEN Scribe   attest that this documentation has been prepared under the direction and in the presence of Alie Rosa MD.    Office: [phone]    We already utilize the scribe attestation, Andriy BURDEN, am scribing for, and in the presence of Dr. Rosa.    Alie BURDEN MD personally performed the services described in the documentation as scribed by Andriy Eastman in my presence, and confirm it is both accurate and complete.    This note was prepared using voice recognition software.  The details of this note are correct and have been reviewed, and corrected to the best of my ability.  Some grammatical errors may persist related to the Dragon software.         [1] No past medical history on file.  [2] No Known  Allergies  [3] No past surgical history on file.     no fever/no chills/no polydipsia/no malaise/no fatigue

## 2025-06-26 ENCOUNTER — OFFICE VISIT (OUTPATIENT)
Dept: ORTHOPEDIC SURGERY | Facility: CLINIC | Age: 40
End: 2025-06-26
Payer: COMMERCIAL

## 2025-06-26 ENCOUNTER — TREATMENT (OUTPATIENT)
Dept: OCCUPATIONAL THERAPY | Facility: CLINIC | Age: 40
End: 2025-06-26
Payer: COMMERCIAL

## 2025-06-26 ENCOUNTER — HOSPITAL ENCOUNTER (OUTPATIENT)
Dept: RADIOLOGY | Facility: CLINIC | Age: 40
Discharge: HOME | End: 2025-06-26
Payer: COMMERCIAL

## 2025-06-26 DIAGNOSIS — M25.642 JOINT STIFFNESS OF HAND, LEFT: Primary | ICD-10-CM

## 2025-06-26 DIAGNOSIS — M20.022 CENTRAL SLIP EXTENSOR TENDON INJURY (BOUTONNIERE), LEFT: ICD-10-CM

## 2025-06-26 DIAGNOSIS — S62.629A AVULSION FRACTURE OF MIDDLE PHALANX OF FINGER, CLOSED, INITIAL ENCOUNTER: ICD-10-CM

## 2025-06-26 DIAGNOSIS — S62.623D CLOSED DISPLACED FRACTURE OF MIDDLE PHALANX OF LEFT MIDDLE FINGER WITH ROUTINE HEALING, SUBSEQUENT ENCOUNTER: ICD-10-CM

## 2025-06-26 PROCEDURE — 97763 ORTHC/PROSTC MGMT SBSQ ENC: CPT | Mod: GO

## 2025-06-26 PROCEDURE — 97110 THERAPEUTIC EXERCISES: CPT | Mod: GO

## 2025-06-26 PROCEDURE — 99024 POSTOP FOLLOW-UP VISIT: CPT | Performed by: ORTHOPAEDIC SURGERY

## 2025-06-26 PROCEDURE — 99212 OFFICE O/P EST SF 10 MIN: CPT | Performed by: ORTHOPAEDIC SURGERY

## 2025-06-26 PROCEDURE — 73140 X-RAY EXAM OF FINGER(S): CPT | Mod: LT

## 2025-06-26 PROCEDURE — 1036F TOBACCO NON-USER: CPT | Performed by: ORTHOPAEDIC SURGERY

## 2025-06-26 ASSESSMENT — PAIN - FUNCTIONAL ASSESSMENT: PAIN_FUNCTIONAL_ASSESSMENT: 0-10

## 2025-06-26 ASSESSMENT — PAIN SCALES - GENERAL: PAINLEVEL_OUTOF10: 0 - NO PAIN

## 2025-06-26 NOTE — PROGRESS NOTES
Patient presents today for ongoing follow-up status post open treatment of intra-articular fracture and central slip disruption left long finger.  Pin sites look great.  He has been working with therapy.  Pins were removed without difficulty.  Maintain static extension splinting at PIP joint with the clamshell brace for 1 additional week.  Then okay to progress with active passive range of motion recovery to the left long finger PIP joint increasing vigor of therapy week to week.  1 pound weightbearing restriction for 4 weeks then full weightbearing.  6-week follow-up with me.  X-rays of left long finger upon return.

## 2025-06-26 NOTE — PROGRESS NOTES
Occupational Therapy   Upper Extremity Treatment Note    Patient Name: Lee Casanova  MRN: [id number]   Date of Injury: 5/9/25  Mechanism of Injury: jammed L MF   Date of Surgery: 5/23/25  Surgical Procedure: x2 pins in PIP  Precautions:  No ROM in PIP L MF          Current Problem  1. Joint stiffness of hand, left        2. Central slip extensor tendon injury (boutonniere), left        3. Closed displaced fracture of middle phalanx of left middle finger with routine healing, subsequent encounter           Insurance  *LT MIDDLE FINGER  MMO HMO 20V PT OT ARLENE YR COPAY 0 DED [phone](1156) [phone](1162) COVERAGE 100 OOP 7300(1156) [phone](1162)   NO AUTH REQ REF# [id number]  [employer] [id number]/ALL     Medicare Certification Period:     GENERAL  General  General Comment: Visit #2/8    IMAGING:           Subjective  Pt report: tired of not being able to use finger, no significant pain reported.   Patient would like to functionally improve/chief concern: ROM, strength       Pain:  Location: Left middle finger   0/10 at rest, 2/10 at highest  Description: pins and needles      ASSESSMENT:  Patient with good tolerance to therapy session. Patient reporting good adherence to wearing orthosis. Patient demonstrating inc ROM in DIP and MCP joints this date compared to eval.   Patient is a 40 y.o. RHDM who is s/p L MF avulsion fx repair of extensor tendon with bone graft from distal radius    Homeliving/Social Support: house with wife and two dtrs and 2 dogs     Work: Chiropractor     Meaningful Activities: golf, yard work, cooking     Previous Level of Function Per Patient/Caregiver Report: Independent with ADL, IADL, work and leisure activities    Chief complaint: tightness/decreased ROM     Patient stated goals for therapy: increase ROM and strength     Objective  Hand Dominance: RIGHT     Affected Extremity:   LEFT     Outcome Measures:   At Eval: Quick Dash 77.27    ADLS/IADLS: MOD IND    Skin/ Wound/Scar: Scar flat, healing  well. Tender as expected for stage of recovery.     Sensation: WFL    Dexterity: Monitor    Special Tests: NA        Edema (cm):   Date:  Right Left   WRIST DWC 16.1 16.2   ELBOW  - -        Date:  Right Left   THUMB P1 - -    IP Circumference - -    P2 - -      Date:  Right Left   FINGER P1 - -    PIP Circumference - -    P2 - -    DIP Circumference - -       ROM and STRENGTH    Hand ROM  THUMB AROM  Right Left    Kapandji x/10 X/10          Palmar Abduction -     Radial Abduction -         Finger   Index Middle Ring Small    MCP - - - -    PIP - - - -    DIP - - - -   ADPC (cm)            Hand Strength  DEFERRED until 7/24/25   Cristhian Dynamometer    Gross Grasp (lbs)  Right Left   2nd setting - -       Pinch Strength Right Left   Lateral - -   Tripod - -   Tip  - -       TREATMENT:  -Edema Management including elevation, cryotherapy, Compression   -Clamshell splint adjusted for comfort now that pin removed. patient reporting no discomfort with splint. Demonstrated good ability to doff/don. Pt instructed per Dr. Mayito Delgadillo to wear for one additional week.   -Block PIP completed DIP and MCP AROM   -Discussion of timeline of recovery and protocol placed by Dr. Mayito Delgadillo including no PIP ROM for one more week and no passive ROM of DIP joint until one more week.     HEP and Patient Education:  -See treatment section above.   -Orthosis full time off for hygiene and exercises. Educated patient to wear, care, purpose, and precautions.  -Educated patient to diagnosis and rehab expectations including frequency and duration of services.         PLAN OF CARE:   Pt will benefit from skilled OT to increase ROM and strength in order to return to work and I/ADLs without pain.   Follow Up with Referring Physician: Dr. Mayito Delgadillo  Monitor Home Program  Patient instructed to call or email with questions or concerns.     Frequency: 1x/week  Duration: 8 weeks    GOALS:   Patient to demonstrate, with involved extremity  (ies):    -good skill with progressive edema reduction technique facilitating gains with motion and function.   -good carry through with progressive soft tissue management techniques facilitating gains with motion and pain reduction.   -wrist AROM JIMENEZ 120 degrees, to tolerate four point position during cleaning and shifting body position(s).  -finger AROM JIMENEZ 220 degrees, to maintain grasp on ADL objects during use.  -thumb AROM opposition Kapandji 9/10, JIMENEZ MCP and  degrees to use small and large digital screen devices without pain.  -Self report of independence with clothing fasteners without pain.   -independence opening packages, Casillas Pinch 16#  -independence opening jars and turning door knobs,  Strength 70#  -good skill with progressive orthosis regimen, applying orthosis correctly and using according to medically necessary wear schedule as prescribed by therapist  -Patient to report pain 0/10 at rest for sleeping  -Patient to score disability rate less than 10% on Quick DASH  Patient verbalized good understanding of Therapy Plan of Care and is in a agreement with Occupational Therapy Goals.       CHART REVIEW: YES       OT Therapeutic Procedures Time Entry  Therapeutic Exercise Time Entry: 13  Orthotic/Prosthetic Mgmt and/or Training (Subs Enctr) Time Entry: 25

## 2025-07-01 ENCOUNTER — APPOINTMENT (OUTPATIENT)
Dept: OCCUPATIONAL THERAPY | Facility: CLINIC | Age: 40
End: 2025-07-01
Payer: COMMERCIAL

## 2025-07-03 ENCOUNTER — APPOINTMENT (OUTPATIENT)
Dept: OCCUPATIONAL THERAPY | Facility: CLINIC | Age: 40
End: 2025-07-03
Payer: COMMERCIAL

## 2025-07-17 ENCOUNTER — TREATMENT (OUTPATIENT)
Dept: OCCUPATIONAL THERAPY | Facility: CLINIC | Age: 40
End: 2025-07-17
Payer: COMMERCIAL

## 2025-07-17 DIAGNOSIS — M25.642 JOINT STIFFNESS OF HAND, LEFT: Primary | ICD-10-CM

## 2025-07-17 DIAGNOSIS — M20.022 CENTRAL SLIP EXTENSOR TENDON INJURY (BOUTONNIERE), LEFT: ICD-10-CM

## 2025-07-17 DIAGNOSIS — S62.623D CLOSED DISPLACED FRACTURE OF MIDDLE PHALANX OF LEFT MIDDLE FINGER WITH ROUTINE HEALING, SUBSEQUENT ENCOUNTER: ICD-10-CM

## 2025-07-17 PROCEDURE — 97140 MANUAL THERAPY 1/> REGIONS: CPT | Mod: GO

## 2025-07-17 PROCEDURE — 97110 THERAPEUTIC EXERCISES: CPT | Mod: GO

## 2025-07-17 ASSESSMENT — PAIN SCALES - GENERAL: PAINLEVEL_OUTOF10: 0 - NO PAIN

## 2025-07-17 ASSESSMENT — PAIN - FUNCTIONAL ASSESSMENT: PAIN_FUNCTIONAL_ASSESSMENT: 0-10

## 2025-07-17 NOTE — PROGRESS NOTES
Occupational Therapy   Upper Extremity Treatment Note    Patient Name: Lee Casanova  MRN: [id number]   Date of Injury: 5/9/25  Mechanism of Injury: nano TROY MF   Date of Surgery: 5/23/25  Surgical Procedure: x2 pins in PIP, Removed 6/26  Precautions:  1 lb WB until 7/24         Current Problem  1. Joint stiffness of hand, left        2. Central slip extensor tendon injury (boutonniere), left        3. Closed displaced fracture of middle phalanx of left middle finger with routine healing, subsequent encounter             Insurance  *LT MIDDLE FINGER  MMO HMO 20V PT OT ARLENE YR COPAY 0 DED [phone](1156) [phone](1162) COVERAGE 100 OOP 7300(1156) [phone](1162)   NO AUTH REQ REF# [id number]  [employer] [id number]/ALL     Medicare Certification Period:     GENERAL  General  General Comment: Visit #3/8    IMAGING:           Subjective  Pt report: Completing AROM/PROM in home setting. Patient reports no pain.   Patient would like to functionally improve/chief concern: ROM, strength       Pain:  Location: Left middle finger   0/10 at rest, 2/10 at highest  Description: pins and needles      ASSESSMENT:  Patient with good tolerance to therapy session. Pt with very limited DIP and PIP AROM. Patient with high pain tolerance and good tolerance to PROM.   Patient is a 40 y.o. RHDM who is s/p L MF avulsion fx repair of extensor tendon with bone graft from distal radius    Homeliving/Social Support: house with wife and two dtrs and 2 dogs     Work: Chiropractor     Meaningful Activities: golf, yard work, cooking     Previous Level of Function Per Patient/Caregiver Report: Independent with ADL, IADL, work and leisure activities    Chief complaint: tightness/decreased ROM     Patient stated goals for therapy: increase ROM and strength     Objective  Hand Dominance: RIGHT     Affected Extremity:   LEFT     Outcome Measures:   At Eval: Quick Dash 77.27    ADLS/IADLS: MOD IND    Skin/ Wound/Scar: Scar flat, healing well. Tender as expected for  stage of recovery.     Sensation: WFL    Dexterity: Monitor    Special Tests: NA        Edema (cm):   Date:  Right Left   WRIST DWC 16.1 16.2        Date:  Right Left   FINGER P1 6.4 6.3    PIP Circumference 6.4 6.7    P2 5.8 5.6    DIP Circumference 5.5 5.0       ROM and STRENGTH  Hand ROM     Finger   Middle    MCP 90    PIP 40    DIP 10   ADPC (cm)         Hand Strength  DEFERRED until 7/24/25   Cristhian Dynamometer    Gross Grasp (lbs)  Right Left   2nd setting - -       Pinch Strength Right Left   Lateral - -   Tripod - -   Tip  - -       TREATMENT:  -Fluidotherapy with AROM ASL   -Measurements taken   -IASTM MF L  -PROM DIP and PIP flexion   -KT tape into flexion on MF   -[place]ima slides to encourage PIP and DIP flexion/extension   -Edema Management including elevation, cryotherapy, Compression       HEP and Patient Education:  -See treatment section above.   -Orthosis full time off for hygiene and exercises. Educated patient to wear, care, purpose, and precautions.  -Educated patient to diagnosis and rehab expectations including frequency and duration of services.         PLAN OF CARE:   Pt will benefit from skilled OT to increase ROM and strength in order to return to work and I/ADLs without pain.   Follow Up with Referring Physician: Dr. Mayito Delgadillo  Monitor Home Program  Patient instructed to call or email with questions or concerns.     Frequency: 1x/week  Duration: 8 weeks    GOALS:   Patient to demonstrate, with involved extremity (ies):    -good skill with progressive edema reduction technique facilitating gains with motion and function.   -good carry through with progressive soft tissue management techniques facilitating gains with motion and pain reduction.   -finger AROM JIMENEZ 220 degrees, to maintain grasp on ADL objects during use.  -Self report of independence with clothing fasteners without pain.   -independence opening packages, Casillas Pinch 16#  -independence opening jars and turning door knobs,   Strength 70#  -good skill with progressive orthosis regimen, applying orthosis correctly and using according to medically necessary wear schedule as prescribed by therapist  -Patient to report pain 0/10 at rest for sleeping  -Patient to score disability rate less than 10% on Quick DASH  Patient verbalized good understanding of Therapy Plan of Care and is in a agreement with Occupational Therapy Goals.       CHART REVIEW: YES       OT Therapeutic Procedures Time Entry  Therapeutic Exercise Time Entry: 32  Manual Therapy Time Entry: 8

## 2025-07-24 ENCOUNTER — TREATMENT (OUTPATIENT)
Dept: OCCUPATIONAL THERAPY | Facility: CLINIC | Age: 40
End: 2025-07-24
Payer: COMMERCIAL

## 2025-07-24 DIAGNOSIS — S62.623D CLOSED DISPLACED FRACTURE OF MIDDLE PHALANX OF LEFT MIDDLE FINGER WITH ROUTINE HEALING, SUBSEQUENT ENCOUNTER: ICD-10-CM

## 2025-07-24 DIAGNOSIS — M20.022 CENTRAL SLIP EXTENSOR TENDON INJURY (BOUTONNIERE), LEFT: ICD-10-CM

## 2025-07-24 DIAGNOSIS — M25.642 JOINT STIFFNESS OF HAND, LEFT: Primary | ICD-10-CM

## 2025-07-24 PROCEDURE — 97110 THERAPEUTIC EXERCISES: CPT | Mod: GO

## 2025-07-24 PROCEDURE — 97140 MANUAL THERAPY 1/> REGIONS: CPT | Mod: GO

## 2025-07-24 ASSESSMENT — PAIN - FUNCTIONAL ASSESSMENT: PAIN_FUNCTIONAL_ASSESSMENT: 0-10

## 2025-07-24 ASSESSMENT — PAIN SCALES - GENERAL: PAINLEVEL_OUTOF10: 1

## 2025-07-24 NOTE — PROGRESS NOTES
"    Occupational Therapy   Upper Extremity Treatment Note    Patient Name: Lee Casanova  MRN: [id number]   Date of Injury: 5/9/25  Mechanism of Injury: pedromed L MF   Date of Surgery: 5/23/25  Surgical Procedure: x2 pins in PIP, Removed 6/26  Precautions:  WBAT         Current Problem  1. Joint stiffness of hand, left        2. Central slip extensor tendon injury (boutonniere), left        3. Closed displaced fracture of middle phalanx of left middle finger with routine healing, subsequent encounter               Insurance  *LT MIDDLE FINGER  MMO HMO 20V PT OT ARLENE YR COPAY 0 DED 7300(1156) [phone](1162) COVERAGE 100 OOP 7300(1156) [phone](1162)   NO AUTH REQ REF# [id number]  AVA[employer] [id number]/ALL     Medicare Certification Period:     GENERAL  General  General Comment: Visit #4/8    IMAGING:           Subjective  Pt report: \"I still can't grasp anything so that's the hardest thing.\" \"I think the sports tape helped, but I am just going to supplement that with some stretches.\" \"I caught that finger on my steering wheel and it pushed it to the side a bit, something popped but it didn't swell or hurt.\"   Patient would like to functionally improve/chief concern: ROM, strength       Pain:  Location: Left middle finger   0/10 at rest, 2/10 at highest  Description: pins and needles      ASSESSMENT:  Patient with good tolerance to therapy session. Pt able to tolerate all exercises with no inc in pain.   Patient is a 40 y.o. RHDM who is s/p L MF avulsion fx repair of extensor tendon with bone graft from distal radius    Homeliving/Social Support: house with wife and two dtrs and 2 dogs     Work: Chiropractor     Meaningful Activities: golf, yard work, cooking     Previous Level of Function Per Patient/Caregiver Report: Independent with ADL, IADL, work and leisure activities    Chief complaint: tightness/decreased ROM     Patient stated goals for therapy: increase ROM and strength     Objective  Hand Dominance: RIGHT     Affected " Extremity:   LEFT     Outcome Measures:   At Eval: Quick Dash 77.27    ADLS/IADLS: MOD IND    Skin/ Wound/Scar: Scar flat, healing well. Tender as expected for stage of recovery.     Sensation: WFL    Dexterity: Monitor    Special Tests: NA        Edema (cm):   Date:  Right Left   WRIST DWC 16.1 16.2        Date:  Right Left   FINGER P1 6.4 6.3    PIP Circumference 6.4 6.7    P2 5.8 5.6    DIP Circumference 5.5 5.0       ROM and STRENGTH  Hand ROM     Finger   Middle    MCP 90    PIP 45    DIP 20   ADPC (cm)         Hand Strength  7/24/25   Cristhian Dynamometer    Gross Grasp (lbs)  Right Left   2nd setting 91 27       Pinch Strength Right Left   Lateral 18 14   Tripod 15 6   Tip  12 6       TREATMENT:  -Paraffin wax, and gripping warm wax   -Measurements taken   -IASTM MF L  -red tbar twists   -Green alison wedges   -Dexterity balls   -Fine motor dexterity board   -PROM DIP and PIP flexion   -Carson slides to encourage PIP and DIP flexion/extension   -Edema Management including elevation, cryotherapy, Compression       HEP and Patient Education:  -See treatment section above.   -Orthosis full time off for hygiene and exercises. Educated patient to wear, care, purpose, and precautions.  -Educated patient to diagnosis and rehab expectations including frequency and duration of services.         PLAN OF CARE:   Pt will benefit from skilled OT to increase ROM and strength in order to return to work and I/ADLs without pain.   Follow Up with Referring Physician: Dr. Mayito Delgadillo  Monitor Home Program  Patient instructed to call or email with questions or concerns.     Frequency: 1x/week  Duration: 8 weeks    GOALS:   Patient to demonstrate, with involved extremity (ies):    -good skill with progressive edema reduction technique facilitating gains with motion and function.   -good carry through with progressive soft tissue management techniques facilitating gains with motion and pain reduction.   -finger AROM JIMENEZ 220 degrees,  to maintain grasp on ADL objects during use.  -Self report of independence with clothing fasteners without pain.   -independence opening packages, Cas[place] Pinch 16#  -independence opening jars and turning door knobs,  Strength 70#  -good skill with progressive orthosis regimen, applying orthosis correctly and using according to medically necessary wear schedule as prescribed by therapist  -Patient to report pain 0/10 at rest for sleeping  -Patient to score disability rate less than 10% on Quick DASH  Patient verbalized good understanding of Therapy Plan of Care and is in a agreement with Occupational Therapy Goals.       CHART REVIEW: YES       OT Therapeutic Procedures Time Entry  Therapeutic Exercise Time Entry: 32  Manual Therapy Time Entry: 8

## 2025-07-31 ENCOUNTER — TREATMENT (OUTPATIENT)
Dept: OCCUPATIONAL THERAPY | Facility: CLINIC | Age: 40
End: 2025-07-31
Payer: COMMERCIAL

## 2025-07-31 DIAGNOSIS — S62.623D CLOSED DISPLACED FRACTURE OF MIDDLE PHALANX OF LEFT MIDDLE FINGER WITH ROUTINE HEALING, SUBSEQUENT ENCOUNTER: ICD-10-CM

## 2025-07-31 DIAGNOSIS — M20.022 CENTRAL SLIP EXTENSOR TENDON INJURY (BOUTONNIERE), LEFT: ICD-10-CM

## 2025-07-31 DIAGNOSIS — M25.642 JOINT STIFFNESS OF HAND, LEFT: Primary | ICD-10-CM

## 2025-07-31 PROCEDURE — 97110 THERAPEUTIC EXERCISES: CPT | Mod: GO

## 2025-07-31 PROCEDURE — 97140 MANUAL THERAPY 1/> REGIONS: CPT | Mod: GO

## 2025-07-31 ASSESSMENT — PAIN SCALES - GENERAL: PAINLEVEL_OUTOF10: 0 - NO PAIN

## 2025-07-31 ASSESSMENT — PAIN - FUNCTIONAL ASSESSMENT: PAIN_FUNCTIONAL_ASSESSMENT: 0-10

## 2025-07-31 NOTE — PROGRESS NOTES
"    Occupational Therapy   Upper Extremity Treatment Note    Patient Name: Lee Casanova  MRN: [id number]   Date of Injury: 5/9/25  Mechanism of Injury: jammed L MF   Date of Surgery: 5/23/25  Surgical Procedure: x2 pins in PIP, Removed 6/26  Precautions:  WBAT         Current Problem  1. Joint stiffness of hand, left        2. Central slip extensor tendon injury (boutonniere), left        3. Closed displaced fracture of middle phalanx of left middle finger with routine healing, subsequent encounter                 Insurance  *LT MIDDLE FINGER  MMO HMO 20V PT OT ARLENE YR COPAY 0 DED 7300(1156) 50434(1162) COVERAGE 100 OOP 7300(1156) [phone](1162)   NO AUTH REQ REF# [id number]  [employer] [id number]/ALL     Medicare Certification Period:     GENERAL  General  General Comment: Visit #5/8    IMAGING:           Subjective  Pt report: \"I am doing things more but I am not getting more motion back.\"  Patient would like to functionally improve/chief concern: ROM, strength       Pain:  Location: Left middle finger   0/10 at rest, 2/10 at highest  Description: pins and needles      ASSESSMENT:  Patient with good tolerance to therapy session. Pt with limited AROM.  Patient is a 40 y.o. RHDM who is s/p L MF avulsion fx repair of extensor tendon with bone graft from distal radius    Homeliving/Social Support: house with wife and two dtrs and 2 dogs     Work: Chiropractor     Meaningful Activities: golf, yard work, cooking     Previous Level of Function Per Patient/Caregiver Report: Independent with ADL, IADL, work and leisure activities    Chief complaint: tightness/decreased ROM     Patient stated goals for therapy: increase ROM and strength     Objective  Hand Dominance: RIGHT     Affected Extremity:   LEFT     Outcome Measures:   At Eval: Quick Dash 77.27    ADLS/IADLS: MOD IND    Skin/ Wound/Scar: Scar flat, healing well. Tender as expected for stage of recovery.     Sensation: WFL    Dexterity: Monitor    Special Tests: NA     "    Edema (cm):   Date:  Right Left   WRIST DWC 16.1 16.2        Date: 7/31/25 Right Left   FINGER MF P1 6.4 6.0    PIP Circumference 6.4 6.7    P2 5.8 5.4    DIP Circumference 5.5 5.0       ROM and STRENGTH  Hand ROM     Finger   Middle    MCP 90    PIP 50    DIP 25   ADPC (cm)         Hand Strength  7/31/25   Cristhian Dynamometer    Gross Grasp (lbs)  Right Left   2nd setting 91 33       Pinch Strength Right Left   Lateral 18 14   Tripod 15 8   Tip  12 12       TREATMENT:  -Paraffin wax, and gripping warm wax   -Measurements taken   -IASTM MF L  -PROM PIP MF   -red tbar twists   -Green alison wedges   -Dexterity balls   -Fine motor dexterity board   -PROM DIP and PIP flexion   -Simpson slides to encourage PIP and DIP flexion/extension   -Edema Management including elevation, cryotherapy, Compression       HEP and Patient Education:  -See treatment section above.   -Orthosis full time off for hygiene and exercises. Educated patient to wear, care, purpose, and precautions.  -Educated patient to diagnosis and rehab expectations including frequency and duration of services.         PLAN OF CARE:   Pt will benefit from skilled OT to increase ROM and strength in order to return to work and I/ADLs without pain.   Follow Up with Referring Physician: Dr. Mayito Delgadillo  Monitor Home Program  Patient instructed to call or email with questions or concerns.     Frequency: 1x/week  Duration: 8 weeks    GOALS:   Patient to demonstrate, with involved extremity (ies):    -good skill with progressive edema reduction technique facilitating gains with motion and function.   -good carry through with progressive soft tissue management techniques facilitating gains with motion and pain reduction.   -finger AROM JIMENEZ 220 degrees, to maintain grasp on ADL objects during use.  -Self report of independence with clothing fasteners without pain.   -independence opening packages, Cas[place] Pinch 16#  -independence opening jars and turning door knobs,   Strength 70#  -good skill with progressive orthosis regimen, applying orthosis correctly and using according to medically necessary wear schedule as prescribed by therapist  -Patient to report pain 0/10 at rest for sleeping  -Patient to score disability rate less than 10% on Quick DASH  Patient verbalized good understanding of Therapy Plan of Care and is in a agreement with Occupational Therapy Goals.       CHART REVIEW: YES       OT Therapeutic Procedures Time Entry  Therapeutic Exercise Time Entry: 34  Manual Therapy Time Entry: 8

## 2025-08-05 ENCOUNTER — TREATMENT (OUTPATIENT)
Dept: OCCUPATIONAL THERAPY | Facility: CLINIC | Age: 40
End: 2025-08-05
Payer: COMMERCIAL

## 2025-08-05 DIAGNOSIS — S62.623D CLOSED DISPLACED FRACTURE OF MIDDLE PHALANX OF LEFT MIDDLE FINGER WITH ROUTINE HEALING, SUBSEQUENT ENCOUNTER: Primary | ICD-10-CM

## 2025-08-05 PROCEDURE — 97110 THERAPEUTIC EXERCISES: CPT | Mod: GO | Performed by: OCCUPATIONAL THERAPIST

## 2025-08-05 ASSESSMENT — PAIN SCALES - GENERAL: PAINLEVEL_OUTOF10: 0 - NO PAIN

## 2025-08-05 ASSESSMENT — PAIN - FUNCTIONAL ASSESSMENT: PAIN_FUNCTIONAL_ASSESSMENT: 0-10

## 2025-08-05 NOTE — PROGRESS NOTES
"    Occupational Therapy   Upper Extremity Treatment Note    Patient Name: Lee Casanova  MRN: [id number]   Date of Injury: 5/9/25  Mechanism of Injury: nano L MF   Date of Surgery: 5/23/25  Surgical Procedure: x2 pins in PIP, Removed 6/26  Precautions:  WBAT         Current Problem  intra-articular fracture and central slip disruption left long finger            Insurance  *LT MIDDLE FINGER  MMO HMO 20V PT OT ARLENE YR COPAY 0 DED 7300(1156) [id number](1162) COVERAGE 100 OOP 7300(1156) [phone](1162)   NO AUTH REQ REF# [id number]  AVA[employer] [id number]/ALL     Medicare Certification Period:     GENERAL  General  General Comment: Visit 6/8    IMAGING:           Subjective  Pt report: \"I don't feel like my finger is bending more. I have been trying to do yard work with this hand.\"   Patient would like to functionally improve/chief concern: ROM, strength       Pain:  Location: Left middle finger   0/10 at rest, 2/10 at highest  Description: pins and needles      ASSESSMENT:  Patient presented with significant joint stiffness, capsular tightness, scar adhesion, and muscle weakness. Emphasized on PROM using low load long duration technique for HEP to decrease stiffness and tightness prior to active motion. Also added tyra-taping during functional grasping tasks to facilitate overall flexion. Patient demonstrated 10 degrees gain in PIP flexion and 5 degree in DIP flexion at the end of the session. Patient will continue to benefit from skilled OT for ROM and later progress to strengthening to support work and IADL.     Patient is a 40 y.o. RHDM who is s/p L MF avulsion fx repair of extensor tendon with bone graft from distal radius    Homeliving/Social Support: house with wife and two dtrs and 2 dogs     Work: Chiropractor     Meaningful Activities: golf, yard work, cooking     Previous Level of Function Per Patient/Caregiver Report: Independent with ADL, IADL, work and leisure activities    Chief complaint: tightness/decreased ROM "     Patient stated goals for therapy: increase ROM and strength     Objective  Hand Dominance: RIGHT     Affected Extremity:   LEFT     Outcome Measures:   At Eval: Quick Dash 77.27    ADLS/IADLS: MOD IND    Skin/ Wound/Scar: Scar flat, healing well. Tender as expected for stage of recovery.     Sensation: WFL    Dexterity: Monitor    Special Tests: NA        Edema (cm):   Date:  Right Left   WRIST DWC 16.1 16.2        Date: 7/31/25 Right Left   FINGER MF P1 6.4 6.0    PIP Circumference 6.4 6.7    P2 5.8 5.4    DIP Circumference 5.5 5.0       ROM and STRENGTH  Hand ROM     Finger   Middle    MCP 90      PIP 50    DIP 25   ADPC (cm)       *PROM  MF  PIP 65 DIP 55     Hand Strength  7/31/25   Cristhian Dynamometer    Gross Grasp (lbs)  Right Left   2nd setting 91 33       Pinch Strength Right Left   Lateral 18 14   Tripod 15 8   Tip  12 12       TREATMENT:  - Therapist completed PROM MF PIP/DIP isolated and composite flexion in conjunction with paraffin therapy  - AROM hook to fist with 5 sec end range hold  - AROM finger flexion with pen acts as RMO to promote flexor tendon activation  - Intrinsic stretch  - tyra taping during active grasping tasks  - educated patient on progressive increase of end range time during PROM for motion gain      HEP and Patient Education:  -See treatment section above.   -Orthosis full time off for hygiene and exercises. Educated patient to wear, care, purpose, and precautions.  -Educated patient to diagnosis and rehab expectations including frequency and duration of services.         PLAN OF CARE:   Pt will benefit from skilled OT to increase ROM and strength in order to return to work and I/ADLs without pain.   Follow Up with Referring Physician: Dr. Mayito Delgadillo  Monitor Home Program  Patient instructed to call or email with questions or concerns.     Frequency: 1x/week  Duration: 8 weeks    GOALS:   Patient to demonstrate, with involved extremity (ies):    -good skill with progressive  edema reduction technique facilitating gains with motion and function.   -good carry through with progressive soft tissue management techniques facilitating gains with motion and pain reduction.   -finger AROM JIMENEZ 220 degrees, to maintain grasp on ADL objects during use.  -Self report of independence with clothing fasteners without pain.   -independence opening packages, [place] Pinch 16#  -independence opening jars and turning door knobs,  Strength 70#  -good skill with progressive orthosis regimen, applying orthosis correctly and using according to medically necessary wear schedule as prescribed by therapist  -Patient to report pain 0/10 at rest for sleeping  -Patient to score disability rate less than 10% on Quick DASH  Patient verbalized good understanding of Therapy Plan of Care and is in a agreement with Occupational Therapy Goals.       CHART REVIEW: YES       OT Therapeutic Procedures Time Entry  Therapeutic Exercise Time Entry: 40

## 2025-08-07 ENCOUNTER — TREATMENT (OUTPATIENT)
Dept: OCCUPATIONAL THERAPY | Facility: CLINIC | Age: 40
End: 2025-08-07
Payer: COMMERCIAL

## 2025-08-07 ENCOUNTER — OFFICE VISIT (OUTPATIENT)
Dept: ORTHOPEDIC SURGERY | Facility: CLINIC | Age: 40
End: 2025-08-07
Payer: COMMERCIAL

## 2025-08-07 ENCOUNTER — HOSPITAL ENCOUNTER (OUTPATIENT)
Dept: RADIOLOGY | Facility: CLINIC | Age: 40
Discharge: HOME | End: 2025-08-07
Payer: COMMERCIAL

## 2025-08-07 DIAGNOSIS — M20.022 CENTRAL SLIP EXTENSOR TENDON INJURY (BOUTONNIERE), LEFT: ICD-10-CM

## 2025-08-07 DIAGNOSIS — S62.623D CLOSED DISPLACED FRACTURE OF MIDDLE PHALANX OF LEFT MIDDLE FINGER WITH ROUTINE HEALING, SUBSEQUENT ENCOUNTER: Primary | ICD-10-CM

## 2025-08-07 DIAGNOSIS — S62.629A AVULSION FRACTURE OF MIDDLE PHALANX OF FINGER, CLOSED, INITIAL ENCOUNTER: ICD-10-CM

## 2025-08-07 DIAGNOSIS — M20.022 CENTRAL SLIP EXTENSOR TENDON INJURY (BOUTONNIERE), LEFT: Primary | ICD-10-CM

## 2025-08-07 DIAGNOSIS — M25.642 JOINT STIFFNESS OF HAND, LEFT: ICD-10-CM

## 2025-08-07 PROCEDURE — 97140 MANUAL THERAPY 1/> REGIONS: CPT | Mod: GO

## 2025-08-07 PROCEDURE — 73140 X-RAY EXAM OF FINGER(S): CPT | Mod: LT

## 2025-08-07 PROCEDURE — 99212 OFFICE O/P EST SF 10 MIN: CPT | Performed by: ORTHOPAEDIC SURGERY

## 2025-08-07 PROCEDURE — 97110 THERAPEUTIC EXERCISES: CPT | Mod: GO

## 2025-08-07 ASSESSMENT — PATIENT HEALTH QUESTIONNAIRE - PHQ9
SUM OF ALL RESPONSES TO PHQ9 QUESTIONS 1 AND 2: 0
1. LITTLE INTEREST OR PLEASURE IN DOING THINGS: NOT AT ALL
2. FEELING DOWN, DEPRESSED OR HOPELESS: NOT AT ALL

## 2025-08-07 ASSESSMENT — PAIN SCALES - GENERAL: PAINLEVEL_OUTOF10: 2

## 2025-08-07 ASSESSMENT — PAIN - FUNCTIONAL ASSESSMENT: PAIN_FUNCTIONAL_ASSESSMENT: 0-10

## 2025-08-07 ASSESSMENT — PAIN DESCRIPTION - DESCRIPTORS: DESCRIPTORS: SORE

## 2025-08-07 NOTE — PROGRESS NOTES
Patient presents today for ongoing follow-up status post left long finger open treatment of intra-articular fracture of P2 base with repair of central slip through suture anchor construct.  He still struggling with stiffness but making functional gains.  On today's exam he actively flexes to about 45 degrees at the PIP joint with a spongy endpoint.  He states that after his last therapy session he was down to 90 degrees at the PIP joint.  Given timing of surgery there is minimal swelling about the PIP joint.  Recommendations were made for discontinuation of any type of activity restriction and full return to gripping lifting squeezing.  The patient will see us back in 2 months.  No x-rays upon return.  X-rays taken today demonstrate completeness of healing with stable hardware.

## 2025-08-07 NOTE — PROGRESS NOTES
"    Occupational Therapy   Upper Extremity Treatment Note    Patient Name: Lee Casanova  MRN: [id number]   Date of Injury: 5/9/25  Mechanism of Injury: jammed L MF   Date of Surgery: 5/23/25  Surgical Procedure: x2 pins in PIP, Removed 6/26  Precautions:  WBAT         Current Problem  1. Closed displaced fracture of middle phalanx of left middle finger with routine healing, subsequent encounter        2. Joint stiffness of hand, left        3. Central slip extensor tendon injury (boutonniere), left                 Insurance  *LT MIDDLE FINGER  M[employer] HMO 20V PT OT ARLENE YR COPAY 0 DED [phone]) [phone]) COVERAGE 100 OOP [phone]) [phone])   NO AUTH REQ REF# [id number]  AVAILITY [id number]/ALL     Medicare Certification Period:     GENERAL  General  General Comment: Visit 7    IMAGING:           Subjective  Pt report: \"I went to the gym and tried to use this finger, it's sore but I really thing we got it moving well.\" \"I can reach my thumb to my MF\" Pt reports good adherence to HEP.   Patient would like to functionally improve/chief concern: ROM, strength       Pain:  Location: Left middle finger   0/10 at rest, 2/10 at highest  Description: pins and needles      ASSESSMENT:  Patient with good tolerance to therapy session. Pt demonstrating increased ROM this date. Pt still limited by pain.   Patient is a 40 y.o. RHDM who is s/p L MF avulsion fx repair of extensor tendon with bone graft from distal radius    Homeliving/Social Support: house with wife and two dtrs and 2 dogs     Work: Chiropractor     Meaningful Activities: golf, yard work, cooking     Previous Level of Function Per Patient/Caregiver Report: Independent with ADL, IADL, work and leisure activities    Chief complaint: tightness/decreased ROM     Patient stated goals for therapy: increase ROM and strength     Objective  Hand Dominance: RIGHT     Affected Extremity:   LEFT     Outcome Measures:   At Eval: Quick Dash 77.27    ADLS/IADLS: MOD IND    Skin/ " Wound/Scar: Scar flat, healing well. Tender as expected for stage of recovery.     Sensation: WFL    Dexterity: Monitor    Special Tests: NA        Edema (cm):   Date:  Right Left   WRIST DWC 16.1 16.2        Date: 7/31/25 Right Left   FINGER MF P1 6.4 6.0    PIP Circumference 6.4 6.7    P2 5.8 5.4    DIP Circumference 5.5 5.0       ROM and STRENGTH  Hand ROM     Finger   Middle    MCP 90    PIP 70    DIP 34   ADPC (cm)         Hand Strength  7/31/25   Cristhian Dynamometer    Gross Grasp (lbs)  Right Left   2nd setting 91 33       Pinch Strength Right Left   Lateral 18 14   Tripod 15 8   Tip  12 12       TREATMENT:  -Paraffin wax, wrapped in composite fist with coban   -Gripping warm wax   -Measurements taken   -IASTM MF L  -PROM PIP/DIP MF   -Hook fist AROM/PROM   -Weston slides to encourage PIP and DIP flexion/extension   -Edema Management including elevation, cryotherapy, Compression       HEP and Patient Education:  -See treatment section above.   -Orthosis full time off for hygiene and exercises. Educated patient to wear, care, purpose, and precautions.  -Educated patient to diagnosis and rehab expectations including frequency and duration of services.         PLAN OF CARE:   Pt will benefit from skilled OT to increase ROM and strength in order to return to work and I/ADLs without pain.   Follow Up with Referring Physician: Dr. Mayito Delgadillo  Monitor Home Program  Patient instructed to call or email with questions or concerns.     Frequency: 1x/week  Duration: 8 weeks    GOALS:   Patient to demonstrate, with involved extremity (ies):    -good skill with progressive edema reduction technique facilitating gains with motion and function.   -good carry through with progressive soft tissue management techniques facilitating gains with motion and pain reduction.   -finger AROM JIMENEZ 220 degrees, to maintain grasp on ADL objects during use.  -Self report of independence with clothing fasteners without pain.   -independence  opening packages, [place] Pinch 16#  -independence opening jars and turning door knobs,  Strength 70#  -good skill with progressive orthosis regimen, applying orthosis correctly and using according to medically necessary wear schedule as prescribed by therapist  -Patient to report pain 0/10 at rest for sleeping  -Patient to score disability rate less than 10% on Quick DASH  Patient verbalized good understanding of Therapy Plan of Care and is in a agreement with Occupational Therapy Goals.       CHART REVIEW: YES       OT Therapeutic Procedures Time Entry  Therapeutic Exercise Time Entry: 37  Manual Therapy Time Entry: 8

## 2025-08-12 ENCOUNTER — TREATMENT (OUTPATIENT)
Dept: OCCUPATIONAL THERAPY | Facility: CLINIC | Age: 40
End: 2025-08-12
Payer: COMMERCIAL

## 2025-08-12 DIAGNOSIS — M25.642 JOINT STIFFNESS OF HAND, LEFT: Primary | ICD-10-CM

## 2025-08-12 PROCEDURE — 97110 THERAPEUTIC EXERCISES: CPT | Mod: GO

## 2025-08-12 PROCEDURE — 97760 ORTHOTIC MGMT&TRAING 1ST ENC: CPT | Mod: GO

## 2025-08-12 PROCEDURE — L3933 FO W/O JOINTS CF: HCPCS

## 2025-08-14 ENCOUNTER — TREATMENT (OUTPATIENT)
Dept: OCCUPATIONAL THERAPY | Facility: CLINIC | Age: 40
End: 2025-08-14
Payer: COMMERCIAL

## 2025-08-14 DIAGNOSIS — M25.642 JOINT STIFFNESS OF HAND, LEFT: Primary | ICD-10-CM

## 2025-08-14 PROCEDURE — 97110 THERAPEUTIC EXERCISES: CPT | Mod: GO

## 2025-08-14 ASSESSMENT — PAIN - FUNCTIONAL ASSESSMENT: PAIN_FUNCTIONAL_ASSESSMENT: 0-10

## 2025-08-14 ASSESSMENT — PAIN SCALES - GENERAL: PAINLEVEL_OUTOF10: 1

## 2025-08-19 ENCOUNTER — TREATMENT (OUTPATIENT)
Dept: OCCUPATIONAL THERAPY | Facility: CLINIC | Age: 40
End: 2025-08-19
Payer: COMMERCIAL

## 2025-08-19 DIAGNOSIS — M20.022 CENTRAL SLIP EXTENSOR TENDON INJURY (BOUTONNIERE), LEFT: Primary | ICD-10-CM

## 2025-08-19 PROCEDURE — 97110 THERAPEUTIC EXERCISES: CPT | Mod: GO | Performed by: OCCUPATIONAL THERAPIST

## 2025-08-19 ASSESSMENT — PAIN SCALES - GENERAL: PAINLEVEL_OUTOF10: 1

## 2025-08-19 ASSESSMENT — PAIN - FUNCTIONAL ASSESSMENT: PAIN_FUNCTIONAL_ASSESSMENT: 0-10

## 2025-08-26 ENCOUNTER — TREATMENT (OUTPATIENT)
Dept: OCCUPATIONAL THERAPY | Facility: CLINIC | Age: 40
End: 2025-08-26
Payer: COMMERCIAL

## 2025-08-26 DIAGNOSIS — M20.022 CENTRAL SLIP EXTENSOR TENDON INJURY (BOUTONNIERE), LEFT: ICD-10-CM

## 2025-08-26 DIAGNOSIS — S62.629D: ICD-10-CM

## 2025-08-26 DIAGNOSIS — M25.642 JOINT STIFFNESS OF HAND, LEFT: ICD-10-CM

## 2025-08-26 PROCEDURE — 97110 THERAPEUTIC EXERCISES: CPT | Mod: GO

## 2025-08-28 ENCOUNTER — APPOINTMENT (OUTPATIENT)
Dept: OCCUPATIONAL THERAPY | Facility: CLINIC | Age: 40
End: 2025-08-28
Payer: COMMERCIAL

## 2025-09-04 ENCOUNTER — TREATMENT (OUTPATIENT)
Dept: OCCUPATIONAL THERAPY | Facility: CLINIC | Age: 40
End: 2025-09-04
Payer: COMMERCIAL

## 2025-09-04 DIAGNOSIS — M25.642 JOINT STIFFNESS OF HAND, LEFT: ICD-10-CM

## 2025-09-04 DIAGNOSIS — M20.022 CENTRAL SLIP EXTENSOR TENDON INJURY (BOUTONNIERE), LEFT: ICD-10-CM

## 2025-09-04 DIAGNOSIS — S62.629D: ICD-10-CM

## 2025-09-04 PROCEDURE — 97110 THERAPEUTIC EXERCISES: CPT | Mod: GO

## (undated) DEVICE — DRAPE,U/ SHT,SPLIT,PLAS,STERIL: Brand: MEDLINE

## (undated) DEVICE — TAPE,ELASTIC,FOAM,CURAD,3"X5.5YD,LF: Brand: CURAD

## (undated) DEVICE — BLADE,CARBON-STEEL,15,STRL,DISPOSABLE,TB: Brand: MEDLINE

## (undated) DEVICE — NEPTUNE E-SEP SMOKE EVACUATION PENCIL, COATED, 70MM BLADE, PUSH BUTTON SWITCH: Brand: NEPTUNE E-SEP

## (undated) DEVICE — BIPOLAR SEALER 23-113-1 AQM 2.3 OM NEURO: Brand: AQUAMANTYS ®

## (undated) DEVICE — SPONGE GZ W4XL4IN COT 12 PLY TYP VII WVN C FLD DSGN STERILE

## (undated) DEVICE — POSITIONER PT UNIV HD RESTRN DISP

## (undated) DEVICE — SHEET, DRAPE, SPLIT, STERILE: Brand: MEDLINE

## (undated) DEVICE — Device

## (undated) DEVICE — SINGLE PORT MANIFOLD: Brand: NEPTUNE 2

## (undated) DEVICE — NEEDLE CIRCLE 1/2 IN SUTURE TAPER L1.950IN DIA0.056IN ABD S STL

## (undated) DEVICE — PACK,ORTHOPEDIC I: Brand: MEDLINE

## (undated) DEVICE — TUBING, SUCTION, 9/32" X 12', STRAIGHT: Brand: MEDLINE INDUSTRIES, INC.

## (undated) DEVICE — GOWN,SIRUS,POLYRNF,BRTHSLV,XLN/XL,20/CS: Brand: MEDLINE

## (undated) DEVICE — COVER LT HNDL BLU PLAS

## (undated) DEVICE — LIQUIBAND RAPID ADHESIVE 36/CS 0.8ML: Brand: MEDLINE

## (undated) DEVICE — 6.0MM ACORN

## (undated) DEVICE — SUTURE STRATAFIX SPRL MCRYL + SZ 3-0 L18IN ABSRB UD PS-2 SXMP1B107

## (undated) DEVICE — LABEL MED MINI W/ MARKER

## (undated) DEVICE — PAD,ABDOMINAL,8"X10",ST,LF: Brand: MEDLINE

## (undated) DEVICE — STERILE PVP: Brand: MEDLINE INDUSTRIES, INC.

## (undated) DEVICE — APPLICATOR MEDICATED 26 CC SOLUTION HI LT ORNG CHLORAPREP

## (undated) DEVICE — SUTURE VCRL SZ 2-0 L36IN ABSRB UD L36MM CT-1 1/2 CIR J945H

## (undated) DEVICE — BLADE,CARBON-STEEL,10,STRL,DISPOSABLE,TB: Brand: MEDLINE

## (undated) DEVICE — 3M™ STERI-DRAPE™ U-DRAPE 1015: Brand: STERI-DRAPE™

## (undated) DEVICE — SUTURE MCRYL SZ 4-0 L27IN ABSRB UD L19MM PS-2 1/2 CIR PRIM Y426H

## (undated) DEVICE — GLOVE ORANGE PI 8   MSG9080

## (undated) DEVICE — SHEET,DRAPE,53X77,STERILE: Brand: MEDLINE

## (undated) DEVICE — COUNTER NDL 40 COUNT HLD 70 FOAM BLK ADH W/ MAG

## (undated) DEVICE — SYRINGE IRRIG 60ML SFT PLIABLE BLB EZ TO GRP 1 HND USE W/

## (undated) DEVICE — MARKER SURG SKIN GENTIAN VLT REG TIP W/ 6IN RUL

## (undated) DEVICE — 3M™ IOBAN™ 2 ANTIMICROBIAL INCISE DRAPE 6650EZ: Brand: IOBAN™ 2

## (undated) DEVICE — BANDAGE COBAN 4 IN COMPR W4INXL5YD FOAM COHESIVE QUIK STK SELF ADH SFT

## (undated) DEVICE — TOWEL,OR,DSP,ST,BLUE,STD,4/PK,20PK/CS: Brand: MEDLINE

## (undated) DEVICE — YANKAUER,SMOOTH HANDLE,HIGH CAPACITY: Brand: MEDLINE INDUSTRIES, INC.

## (undated) DEVICE — STOCKINETTE,IMPERVIOUS,12X48,STERILE: Brand: MEDLINE

## (undated) DEVICE — SUTURE VCRL SZ 3-0 L36IN ABSRB UD L36MM CT-1 1/2 CIR J944H

## (undated) DEVICE — KIT CHAIR TRIMANO FOAM W/ SUPP ARM DRP ERGONOMICALLY DESIGNED

## (undated) DEVICE — ANTISEPTIC 16OZ H PEROX 1ST AID ORAL DEBRIDING AGNT

## (undated) DEVICE — GOWN,AURORA,NONREINFORCED,LARGE: Brand: MEDLINE

## (undated) DEVICE — GLOVE ORANGE PI 8 1/2   MSG9085

## (undated) DEVICE — 3M™ STERI-DRAPE™ INSTRUMENT POUCH 1018: Brand: STERI-DRAPE™

## (undated) DEVICE — SPONGE,LAP,18"X18",DLX,XR,ST,5/PK,40/PK: Brand: MEDLINE

## (undated) DEVICE — ELECTRODE PT RET AD L9FT HI MOIST COND ADH HYDRGEL CORDED